# Patient Record
Sex: MALE | Race: ASIAN | NOT HISPANIC OR LATINO | Employment: OTHER
[De-identification: names, ages, dates, MRNs, and addresses within clinical notes are randomized per-mention and may not be internally consistent; named-entity substitution may affect disease eponyms.]

---

## 2017-05-16 ENCOUNTER — MYAURORA ACCOUNT LINK (OUTPATIENT)
Dept: OTHER | Age: 77
End: 2017-05-16

## 2017-05-16 ENCOUNTER — PRIOR ORIGINAL RECORDS (OUTPATIENT)
Dept: OTHER | Age: 77
End: 2017-05-16

## 2017-05-22 ENCOUNTER — PRIOR ORIGINAL RECORDS (OUTPATIENT)
Dept: OTHER | Age: 77
End: 2017-05-22

## 2017-05-30 ENCOUNTER — PRIOR ORIGINAL RECORDS (OUTPATIENT)
Dept: OTHER | Age: 77
End: 2017-05-30

## 2017-06-02 ENCOUNTER — OFFICE VISIT (OUTPATIENT)
Dept: INTERNAL MEDICINE CLINIC | Facility: CLINIC | Age: 77
End: 2017-06-02

## 2017-06-02 DIAGNOSIS — E78.5 HYPERLIPIDEMIA, UNSPECIFIED HYPERLIPIDEMIA TYPE: ICD-10-CM

## 2017-06-02 DIAGNOSIS — J30.9 ALLERGIC RHINITIS, UNSPECIFIED ALLERGIC RHINITIS TRIGGER, UNSPECIFIED RHINITIS SEASONALITY: ICD-10-CM

## 2017-06-02 DIAGNOSIS — Z00.00 ENCOUNTER FOR MEDICARE ANNUAL WELLNESS EXAM: Primary | ICD-10-CM

## 2017-06-02 DIAGNOSIS — J45.40 MODERATE PERSISTENT ASTHMA WITHOUT COMPLICATION: ICD-10-CM

## 2017-06-02 DIAGNOSIS — Z23 NEED FOR VACCINATION: ICD-10-CM

## 2017-06-02 DIAGNOSIS — I25.10 CORONARY ARTERY DISEASE INVOLVING NATIVE CORONARY ARTERY OF NATIVE HEART WITHOUT ANGINA PECTORIS: ICD-10-CM

## 2017-06-02 DIAGNOSIS — N40.0 BENIGN PROSTATIC HYPERPLASIA, PRESENCE OF LOWER URINARY TRACT SYMPTOMS UNSPECIFIED, UNSPECIFIED MORPHOLOGY: ICD-10-CM

## 2017-06-02 DIAGNOSIS — I10 ESSENTIAL HYPERTENSION: ICD-10-CM

## 2017-06-02 PROCEDURE — G0009 ADMIN PNEUMOCOCCAL VACCINE: HCPCS | Performed by: INTERNAL MEDICINE

## 2017-06-02 PROCEDURE — 90732 PPSV23 VACC 2 YRS+ SUBQ/IM: CPT | Performed by: INTERNAL MEDICINE

## 2017-06-02 PROCEDURE — G0439 PPPS, SUBSEQ VISIT: HCPCS | Performed by: INTERNAL MEDICINE

## 2017-06-02 NOTE — PROGRESS NOTES
HPI:   Patria Mcginnis is a 77year old male who presents for a Medicare Subsequent Annual Wellness visit (Pt already had Initial Annual Wellness).       His last annual assessment has been over 1 year: Annual Physical due on 04/22/2017         Patient Care T Inhalation Aero Soln Inhale 2 puffs into the lungs every 6 (six) hours as needed for Wheezing. aspirin 81 MG Oral Tab Take 81 mg by mouth daily. Twice daily   pravastatin (PRAVACHOL) 40 MG Oral Tab Take 40 mg by mouth daily.    beclomethasone (QVAR) 80 MC incontinence  MUSCULOSKELETAL: denies back pain  NEURO: denies headaches  PSYCHE: denies depression or anxiety  HEMATOLOGIC: denies hx of anemia  ENDOCRINE: denies thyroid history  ALL/ASTHMA: + hx of allergy or asthma    EXAM:   /84 mmHg  Pulse 59 rashes or lesions   Lymph nodes: Cervical, supraclavicular, and axillary nodes normal   Neurologic: Normal            SUGGESTED VACCINATIONS - Influenza, Pneumococcal, Zoster, Tetanus     Immunization History   Administered Date(s) Administered   • Influen Diet assessment: good     Advanced Directive:  Living Will on file in [address]? Alison Radhaly does not have a Living Will on file in [address]  The patient has this document but we do not have it in [facility], and patient is instructed to get our office a copy of i Fall/Risk Assessment     Do you have 3 or more medical conditions?: 0-No    Have you fallen in the last 12 months?: 0-No    Do you accidently lose urine?: 0-No    Do you have difficulty seeing?: 0-No    Do you have any difficulty walking or getting up? due on 04/23/2018 Update Health Maintenance if applicable    Flex Sigmoidoscopy Screen every 10 years No results found for this or any previous visit. No flowsheet data found.      Fecal Occult Blood Annually No results found for: FOB No flowsheet data foun

## 2017-06-03 VITALS
HEART RATE: 59 BPM | SYSTOLIC BLOOD PRESSURE: 134 MMHG | HEIGHT: 66 IN | BODY MASS INDEX: 29.41 KG/M2 | RESPIRATION RATE: 12 BRPM | WEIGHT: 183 LBS | DIASTOLIC BLOOD PRESSURE: 84 MMHG | TEMPERATURE: 98 F

## 2017-06-20 ENCOUNTER — LAB ENCOUNTER (OUTPATIENT)
Dept: LAB | Age: 77
End: 2017-06-20
Attending: INTERNAL MEDICINE
Payer: MEDICARE

## 2017-06-20 ENCOUNTER — PRIOR ORIGINAL RECORDS (OUTPATIENT)
Dept: OTHER | Age: 77
End: 2017-06-20

## 2017-06-20 DIAGNOSIS — N40.0 BENIGN PROSTATIC HYPERPLASIA, PRESENCE OF LOWER URINARY TRACT SYMPTOMS UNSPECIFIED, UNSPECIFIED MORPHOLOGY: ICD-10-CM

## 2017-06-20 DIAGNOSIS — I25.10 CORONARY ATHEROSCLEROSIS OF NATIVE CORONARY ARTERY: Primary | ICD-10-CM

## 2017-06-20 DIAGNOSIS — E78.5 HYPERLIPIDEMIA, UNSPECIFIED HYPERLIPIDEMIA TYPE: ICD-10-CM

## 2017-06-20 DIAGNOSIS — I25.2 OLD MYOCARDIAL INFARCTION: ICD-10-CM

## 2017-06-20 DIAGNOSIS — I10 ESSENTIAL HYPERTENSION, MALIGNANT: ICD-10-CM

## 2017-06-20 DIAGNOSIS — I10 ESSENTIAL HYPERTENSION: ICD-10-CM

## 2017-06-20 PROCEDURE — 36415 COLL VENOUS BLD VENIPUNCTURE: CPT

## 2017-06-20 PROCEDURE — 80061 LIPID PANEL: CPT

## 2017-06-20 PROCEDURE — 85025 COMPLETE CBC W/AUTO DIFF WBC: CPT

## 2017-06-20 PROCEDURE — 80053 COMPREHEN METABOLIC PANEL: CPT

## 2017-06-21 LAB
ALBUMIN: 4 G/DL
ALKALINE PHOSPHATATE(ALK PHOS): 55 IU/L
ALT (SGPT): 17 U/L
AST (SGOT): 26 U/L
BILIRUBIN TOTAL: 0.8 MG/DL
BILIRUBIN TOTAL: 0.8 MG/DL
BUN: 10 MG/DL
CALCIUM: 9.4 MG/DL
CHLORIDE: 106 MEQ/L
CHOLESTEROL, TOTAL: 138 MG/DL
CREATININE, SERUM: 0.8 MG/DL
GLOBULIN: 3.7 G/DL
GLUCOSE: 95 MG/DL
GLUCOSE: 95 MG/DL
HDL CHOLESTEROL: 33 MG/DL
LDL CHOLESTEROL: 62 MG/DL
NON-HDL CHOLESTEROL: 105 MG/DL
POTASSIUM, SERUM: 4 MEQ/L
PROTEIN, TOTAL: 7.7 G/DL
SGOT (AST): 26 IU/L
SGPT (ALT): 17 IU/L
SODIUM: 139 MEQ/L
TRIGLYCERIDES: 215 MG/DL

## 2017-06-29 ENCOUNTER — TELEPHONE (OUTPATIENT)
Dept: FAMILY MEDICINE CLINIC | Facility: CLINIC | Age: 77
End: 2017-06-29

## 2017-06-29 NOTE — TELEPHONE ENCOUNTER
Informed pt test results. Pt verbalized understanding. Copy of results mailed per pt request.      HI Mr Anatoliy Maldonado,     Here are your lab results. Your psa was normal.   Your cholesterol levels were in good range; triglyceride went up slightly.  Follow low fa

## 2017-07-11 ENCOUNTER — PRIOR ORIGINAL RECORDS (OUTPATIENT)
Dept: OTHER | Age: 77
End: 2017-07-11

## 2017-12-14 ENCOUNTER — PRIOR ORIGINAL RECORDS (OUTPATIENT)
Dept: OTHER | Age: 77
End: 2017-12-14

## 2018-02-06 ENCOUNTER — IMMUNIZATION (OUTPATIENT)
Dept: FAMILY MEDICINE CLINIC | Facility: CLINIC | Age: 78
End: 2018-02-06

## 2018-02-06 ENCOUNTER — OFFICE VISIT (OUTPATIENT)
Dept: FAMILY MEDICINE CLINIC | Facility: CLINIC | Age: 78
End: 2018-02-06

## 2018-02-06 DIAGNOSIS — Z23 NEED FOR INFLUENZA VACCINATION: Primary | ICD-10-CM

## 2018-02-06 PROCEDURE — 90653 IIV ADJUVANT VACCINE IM: CPT | Performed by: NURSE PRACTITIONER

## 2018-02-06 PROCEDURE — G0008 ADMIN INFLUENZA VIRUS VAC: HCPCS | Performed by: NURSE PRACTITIONER

## 2018-02-08 VITALS — TEMPERATURE: 98 F

## 2018-02-23 ENCOUNTER — OFFICE VISIT (OUTPATIENT)
Dept: INTERNAL MEDICINE CLINIC | Facility: CLINIC | Age: 78
End: 2018-02-23

## 2018-02-23 ENCOUNTER — NURSE TRIAGE (OUTPATIENT)
Dept: INTERNAL MEDICINE CLINIC | Facility: CLINIC | Age: 78
End: 2018-02-23

## 2018-02-23 VITALS
HEART RATE: 73 BPM | BODY MASS INDEX: 30 KG/M2 | WEIGHT: 182.88 LBS | SYSTOLIC BLOOD PRESSURE: 138 MMHG | DIASTOLIC BLOOD PRESSURE: 73 MMHG

## 2018-02-23 DIAGNOSIS — S43.005A SHOULDER DISLOCATION, LEFT, INITIAL ENCOUNTER: Primary | ICD-10-CM

## 2018-02-23 PROCEDURE — 99212 OFFICE O/P EST SF 10 MIN: CPT | Performed by: INTERNAL MEDICINE

## 2018-02-23 PROCEDURE — G0463 HOSPITAL OUTPT CLINIC VISIT: HCPCS | Performed by: INTERNAL MEDICINE

## 2018-02-23 NOTE — TELEPHONE ENCOUNTER
Action Requested: Summary for Provider     []  Critical Lab, Recommendations Needed  [] Need Additional Advice  []   FYI    []   Need Orders  [] Need Medications Sent to Pharmacy  []  Other     SUMMARY:    Appointment made for today 2/23/18 with Dr. Fernanda Werner

## 2018-02-23 NOTE — PROGRESS NOTES
HPI:    Patient ID: Indira Chisholm is a 69year old male. History provided by pt and wife  HPI    Left Shoulder Dislocation  Pt arrives for a follow up after recent visit to [facility] Emergency Department on 02/22/2018.  He was diagnosed with Almonte Helena prostatic hypertrophy)    • CORONARY ARTERY DISEASE    • Essential hypertension    • Glaucoma    • History of chest pain    • Hypercholesterolemia    • Hyperlipidemia    • HYPERTENSION    • Hypertension    • Myocardial infarction [facility])     2009   • OTHER D Inhalation Aerosol Inhale 2 puffs into the lungs every morning.  Disp:  Rfl:    Fexofenadine HCl (ALLEGRA OR)  Disp:  Rfl:    AmLODIPine Besylate (NORVASC) 5 MG Oral Tab  Disp:  Rfl: 0   SYMBICORT 160-4.5 MCG/ACT Inhalation Aerosol  Disp:  Rfl: 0   [facility] Nursing note and vitals reviewed. 02/23/18  1518   BP: 138/73   Pulse: 73   Weight: 182 lb 14.4 oz (83 kg)         Body mass index is 29.52 kg/m². Imaging  02/22/2018;  Xray Shoulder Left AP Internal External; Findings = Interval reduction of pre 2/23/2018, 3:11 PM.    .EVA MINA MD,  personally performed the services described in this documentation. All medical record entries made by the scribe were at my direction and in my presence.   I have reviewed the chart and discharge instructions

## 2018-02-23 NOTE — TELEPHONE ENCOUNTER
Pt's spouse, Gabriella Monsalve is calling due to Pt having a dizzy, sweating and vomiting spell for about 1 minute early this morning after coming from ER at [facility] about a displaced shoulder and would like to schedule an appointment with Dr. Jelani Mcnair

## 2018-03-09 ENCOUNTER — HOSPITAL (OUTPATIENT)
Dept: OTHER | Age: 78
End: 2018-03-09
Attending: PEDIATRICS

## 2018-03-16 ENCOUNTER — HOSPITAL (OUTPATIENT)
Dept: OTHER | Age: 78
End: 2018-03-16
Attending: PEDIATRICS

## 2018-04-05 ENCOUNTER — PRIOR ORIGINAL RECORDS (OUTPATIENT)
Dept: OTHER | Age: 78
End: 2018-04-05

## 2018-04-17 ENCOUNTER — PRIOR ORIGINAL RECORDS (OUTPATIENT)
Dept: OTHER | Age: 78
End: 2018-04-17

## 2018-07-16 ENCOUNTER — HOSPITAL (OUTPATIENT)
Dept: OTHER | Age: 78
End: 2018-07-16
Attending: INTERNAL MEDICINE

## 2018-07-16 ENCOUNTER — PRIOR ORIGINAL RECORDS (OUTPATIENT)
Dept: OTHER | Age: 78
End: 2018-07-16

## 2018-07-16 LAB
ALT SERPL-CCNC: 20 UNIT/L
ANION GAP SERPL CALC-SCNC: 10 MMOL/L (ref 10–20)
AST SERPL-CCNC: 17 UNIT/L
BUN SERPL-MCNC: 15 MG/DL (ref 6–20)
BUN/CREAT SERPL: 17 (ref 7–25)
CALCIUM SERPL-MCNC: 8.8 MG/DL (ref 8.4–10.2)
CHLORIDE: 105 MMOL/L (ref 98–107)
CHOLEST SERPL-MCNC: 131 MG/DL
CHOLEST/HDLC SERPL: 3.5 {RATIO}
CO2 SERPL-SCNC: 28 MMOL/L (ref 21–32)
CREAT SERPL-MCNC: 0.88 MG/DL (ref 0.67–1.17)
GLUCOSE SERPL-MCNC: 94 MG/DL (ref 65–99)
HDLC SERPL-MCNC: 37 MG/DL
LDLC SERPL CALC-MCNC: 63 MG/DL
LENGTH OF FAST TIME PATIENT: ABNORMAL HR
LENGTH OF FAST TIME PATIENT: NORMAL HR
NONHDLC SERPL-MCNC: 94 MG/DL
POTASSIUM SERPL-SCNC: 4 MMOL/L (ref 3.4–5.1)
SODIUM SERPL-SCNC: 139 MMOL/L (ref 135–145)
TRIGLYCERIDE (TRIGP): 157 MG/DL

## 2018-07-17 ENCOUNTER — MYAURORA ACCOUNT LINK (OUTPATIENT)
Dept: OTHER | Age: 78
End: 2018-07-17

## 2018-07-17 ENCOUNTER — PRIOR ORIGINAL RECORDS (OUTPATIENT)
Dept: OTHER | Age: 78
End: 2018-07-17

## 2018-07-18 LAB
ALT (SGPT): 20 U/L
AST (SGOT): 17 U/L
BUN: 15 MG/DL
CALCIUM: 8.8 MG/DL
CHLORIDE: 105 MEQ/L
CHOLESTEROL, TOTAL: 131 MG/DL
CREATININE, SERUM: 0.88 MG/DL
GLUCOSE: 94 MG/DL
GLUCOSE: 94 MG/DL
HDL CHOLESTEROL: 37 MG/DL
LDL CHOLESTEROL: 63 MG/DL
NON-HDL CHOLESTEROL: 94 MG/DL
POTASSIUM, SERUM: 4 MEQ/L
SGOT (AST): 17 IU/L
SGPT (ALT): 20 IU/L
SODIUM: 139 MEQ/L
TRIGLYCERIDES: 157 MG/DL

## 2018-09-26 ENCOUNTER — PRIOR ORIGINAL RECORDS (OUTPATIENT)
Dept: OTHER | Age: 78
End: 2018-09-26

## 2018-09-28 ENCOUNTER — PRIOR ORIGINAL RECORDS (OUTPATIENT)
Dept: OTHER | Age: 78
End: 2018-09-28

## 2018-12-10 ENCOUNTER — NURSE TRIAGE (OUTPATIENT)
Dept: OTHER | Age: 78
End: 2018-12-10

## 2018-12-10 NOTE — TELEPHONE ENCOUNTER
Action Requested: Summary for Provider     []  Critical Lab, Recommendations Needed  [] Need Additional Advice  []   FYI    []   Need Orders  [] Need Medications Sent to Pharmacy  []  Other     SUMMARY: Advised patient to go ER, after consulted with Dr Charlene Mullen

## 2018-12-11 ENCOUNTER — HOSPITAL (OUTPATIENT)
Dept: OTHER | Age: 78
End: 2018-12-11
Attending: EMERGENCY MEDICINE

## 2018-12-13 ENCOUNTER — TELEPHONE (OUTPATIENT)
Dept: INTERNAL MEDICINE CLINIC | Facility: CLINIC | Age: 78
End: 2018-12-13

## 2018-12-13 NOTE — TELEPHONE ENCOUNTER
Pt called in requesting to speak with Dr. Ortiz wilkerson in regards to his most recent CT scan that was completed at [facility].

## 2018-12-14 NOTE — TELEPHONE ENCOUNTER
Ct shows no fracture of cervical spine. There are arthritis noted  whih is not unsual for his age. Incidental finding of carotid artery calcification which is hardening of arteries.  He will be having physical with me next week so we can discuss it also the

## 2018-12-15 ENCOUNTER — TELEPHONE (OUTPATIENT)
Dept: INTERNAL MEDICINE CLINIC | Facility: CLINIC | Age: 78
End: 2018-12-15

## 2018-12-15 NOTE — TELEPHONE ENCOUNTER
Patient states faxed results to the office this morning and requesting call ASAP to confirm they were received.      Please advise

## 2018-12-17 NOTE — TELEPHONE ENCOUNTER
Pt called in requesting to speak with Janet in the office. When asked the reason for the call, Pt states to confirm that his \"head scan\" was received via fax. However, looking at the 12/13 encounter, it appears that this as been addressed?      Was

## 2018-12-19 NOTE — TELEPHONE ENCOUNTER
Per patient he faxed another report for CT of the head. Patient has an appt on Friday and informed wife he can bring results with them. Wife and spouse understood.

## 2018-12-20 ENCOUNTER — PRIOR ORIGINAL RECORDS (OUTPATIENT)
Dept: OTHER | Age: 78
End: 2018-12-20

## 2018-12-21 ENCOUNTER — OFFICE VISIT (OUTPATIENT)
Dept: INTERNAL MEDICINE CLINIC | Facility: CLINIC | Age: 78
End: 2018-12-21
Payer: MEDICARE

## 2018-12-21 DIAGNOSIS — Z91.81 AT RISK FOR FALLS: ICD-10-CM

## 2018-12-21 DIAGNOSIS — H91.93 BILATERAL HEARING LOSS, UNSPECIFIED HEARING LOSS TYPE: ICD-10-CM

## 2018-12-21 DIAGNOSIS — I65.29 CAROTID ATHEROSCLEROSIS, UNSPECIFIED LATERALITY: ICD-10-CM

## 2018-12-21 DIAGNOSIS — I10 ESSENTIAL HYPERTENSION: ICD-10-CM

## 2018-12-21 DIAGNOSIS — I25.10 CORONARY ARTERY DISEASE INVOLVING NATIVE CORONARY ARTERY OF NATIVE HEART WITHOUT ANGINA PECTORIS: ICD-10-CM

## 2018-12-21 DIAGNOSIS — J45.40 MODERATE PERSISTENT ASTHMA WITHOUT COMPLICATION: ICD-10-CM

## 2018-12-21 DIAGNOSIS — E78.00 PURE HYPERCHOLESTEROLEMIA: ICD-10-CM

## 2018-12-21 DIAGNOSIS — N40.0 BENIGN PROSTATIC HYPERPLASIA, UNSPECIFIED WHETHER LOWER URINARY TRACT SYMPTOMS PRESENT: ICD-10-CM

## 2018-12-21 DIAGNOSIS — J30.9 ALLERGIC RHINITIS, UNSPECIFIED SEASONALITY, UNSPECIFIED TRIGGER: ICD-10-CM

## 2018-12-21 DIAGNOSIS — Z00.00 ENCOUNTER FOR MEDICARE ANNUAL WELLNESS EXAM: Primary | ICD-10-CM

## 2018-12-21 PROCEDURE — G0439 PPPS, SUBSEQ VISIT: HCPCS | Performed by: INTERNAL MEDICINE

## 2018-12-21 PROCEDURE — G0008 ADMIN INFLUENZA VIRUS VAC: HCPCS | Performed by: INTERNAL MEDICINE

## 2018-12-21 PROCEDURE — 90653 IIV ADJUVANT VACCINE IM: CPT | Performed by: INTERNAL MEDICINE

## 2018-12-21 RX ORDER — MV-MN/OM3/DHA/EPA/FISH/LUT/ZEA 250-5-1 MG
1 CAPSULE ORAL DAILY
COMMUNITY

## 2018-12-23 VITALS
RESPIRATION RATE: 12 BRPM | HEART RATE: 61 BPM | HEIGHT: 65.5 IN | BODY MASS INDEX: 29.46 KG/M2 | DIASTOLIC BLOOD PRESSURE: 80 MMHG | SYSTOLIC BLOOD PRESSURE: 130 MMHG | TEMPERATURE: 98 F | WEIGHT: 179 LBS

## 2018-12-23 PROBLEM — H91.93 BILATERAL HEARING LOSS: Status: ACTIVE | Noted: 2018-12-23

## 2018-12-23 PROBLEM — I65.29 CAROTID ATHEROSCLEROSIS: Status: ACTIVE | Noted: 2018-12-23

## 2018-12-23 PROBLEM — Z91.81 AT RISK FOR FALLS: Status: ACTIVE | Noted: 2018-12-23

## 2018-12-23 NOTE — PROGRESS NOTES
HPI:   Olman Ny is a 69year old male who presents for a Medicare Subsequent Annual Wellness visit (Pt already had Initial Annual Wellness).       His last annual assessment has been over 1 year: Annual Physical due on 06/02/2018         Fall/Risk Asse Patient Care Team: Patient Care Team:  Familia Stephenson MD as PCP - General (Internal Medicine)    Patient Active Problem List:     Allergic rhinitis     Encounter for Medicare annual wellness exam     Hyperlipidemia     Benign prostatic hyperplasia Inhalation Aero Soln Inhale 2 puffs into the lungs every 6 (six) hours as needed for Wheezing. aspirin 81 MG Oral Tab Take 81 mg by mouth daily. Twice daily   pravastatin (PRAVACHOL) 40 MG Oral Tab Take 40 mg by mouth daily.    beclomethasone (QVAR) 80 MC Right arm, Patient Position: Sitting, Cuff Size: adult)   Pulse 61   Temp 98 °F (36.7 °C) (Tympanic)   Resp 12   Ht 5' 5.5\" (1.664 m)   Wt 179 lb (81.2 kg)   BMI 29.33 kg/m²   Estimated body mass index is 29.33 kg/m² as calculated from the following:    H and external ear canals, both ears   Nose: Nares normal, septum midline, mucosa normal, no drainage or sinus tenderness   Throat: Lips, mucosa, and tongue normal; teeth and gums normal   Neck: Supple, symmetrical, trachea midline, no adenopathy, thyroid: n without angina pectoris  Plan: stable and continue to ff with his cardio. On asa and statin tx.     (J45.40) Moderate persistent asthma without complication  Plan: pt had been ff by pulmonologist and on PRABHJOT inhaler and steroid/LABA inhaler.  Pt given flu s Value   06/13/2013 5.9       No flowsheet data found.     Fasting Blood Sugar (FSB)Annually Glucose (mg/dL)   Date Value   06/20/2017 95     GLUCOSE (P) (mg/dL)   Date Value   04/18/2016 96       Cardiovascular Disease Screening     LDL Annually LDL Cholest needed    Zoster   Not covered by Medicare Part B No vaccine history found This may be covered with your pharmacy  prescription benefits      [facility] Internal Lab or Procedure External Lab or Procedure      Annual Monitoring of Persiste

## 2019-02-09 ENCOUNTER — DIAGNOSTIC TRANS (OUTPATIENT)
Dept: OTHER | Age: 79
End: 2019-02-09

## 2019-02-09 LAB
ANALYZER ANC (IANC): ABNORMAL
ANION GAP SERPL CALC-SCNC: 12 MMOL/L (ref 10–20)
BASOPHILS # BLD: 0.1 THOUSAND/MCL (ref 0–0.3)
BASOPHILS NFR BLD: 1 %
BUN SERPL-MCNC: 16 MG/DL (ref 6–20)
BUN/CREAT SERPL: 14 (ref 7–25)
CALCIUM SERPL-MCNC: 8.9 MG/DL (ref 8.4–10.2)
CHLORIDE: 100 MMOL/L (ref 98–107)
CO2 SERPL-SCNC: 27 MMOL/L (ref 21–32)
CREAT SERPL-MCNC: 1.14 MG/DL (ref 0.67–1.17)
DIFFERENTIAL METHOD BLD: ABNORMAL
EOSINOPHIL # BLD: 0.3 THOUSAND/MCL (ref 0.1–0.5)
EOSINOPHIL NFR BLD: 2 %
ERYTHROCYTE [DISTWIDTH] IN BLOOD: 13 % (ref 11–15)
GLUCOSE SERPL-MCNC: 101 MG/DL (ref 65–99)
HEMATOCRIT: 35 % (ref 39–51)
HGB BLD-MCNC: 11.6 GM/DL (ref 13–17)
IMM GRANULOCYTES # BLD AUTO: 0.1 THOUSAND/MCL (ref 0–0.2)
IMM GRANULOCYTES NFR BLD: 1 %
LYMPHOCYTES # BLD: 1.5 THOUSAND/MCL (ref 1–4)
LYMPHOCYTES NFR BLD: 13 %
MAGNESIUM SERPL-MCNC: 1.9 MG/DL (ref 1.7–2.4)
MCH RBC QN AUTO: 28.9 PG (ref 26–34)
MCHC RBC AUTO-ENTMCNC: 33.1 GM/DL (ref 32–36.5)
MCV RBC AUTO: 87.1 FL (ref 78–100)
MONOCYTES # BLD: 1.1 THOUSAND/MCL (ref 0.3–0.9)
MONOCYTES NFR BLD: 9 %
NEUTROPHILS # BLD: 9.1 THOUSAND/MCL (ref 1.8–7.7)
NEUTROPHILS NFR BLD: 74 %
NEUTS SEG NFR BLD: ABNORMAL %
NRBC (NRBCRE): 0 /100 WBC
PLATELET # BLD: 336 THOUSAND/MCL (ref 140–450)
POTASSIUM SERPL-SCNC: 3.6 MMOL/L (ref 3.4–5.1)
RBC # BLD: 4.02 MILLION/MCL (ref 4.5–5.9)
SODIUM SERPL-SCNC: 135 MMOL/L (ref 135–145)
TROPONIN I SERPL HS-MCNC: <0.02 NG/ML
WBC # BLD: 12.1 THOUSAND/MCL (ref 4.2–11)

## 2019-02-10 ENCOUNTER — HOSPITAL (OUTPATIENT)
Dept: OTHER | Age: 79
End: 2019-02-10
Attending: INTERNAL MEDICINE

## 2019-02-10 ENCOUNTER — HOSPITAL (OUTPATIENT)
Dept: OTHER | Age: 79
End: 2019-02-10

## 2019-02-10 LAB
2009 H1N1 SUBTYPE (RF1N1): NOT DETECTED
ADENOVIRUS (RADENO): NOT DETECTED
ANALYZER ANC (IANC): ABNORMAL
ANION GAP SERPL CALC-SCNC: 14 MMOL/L (ref 10–20)
BOCAVIRUS (RBOCA): NOT DETECTED
BUN SERPL-MCNC: 15 MG/DL (ref 6–20)
BUN/CREAT SERPL: 17 (ref 7–25)
C. PNEUMONIAE (RCHLP): NOT DETECTED
CALCIUM SERPL-MCNC: 9 MG/DL (ref 8.4–10.2)
CHLORIDE: 104 MMOL/L (ref 98–107)
CO2 SERPL-SCNC: 23 MMOL/L (ref 21–32)
CORONAVIRUS 229E (RC229E): NOT DETECTED
CORONAVIRUS HKU1 (RCHKU1): NOT DETECTED
CORONAVIRUS NL63 (RCNL63): NOT DETECTED
CORONAVIRUS OC43 (RCO43): NOT DETECTED
CREAT SERPL-MCNC: 0.9 MG/DL (ref 0.67–1.17)
ERYTHROCYTE [DISTWIDTH] IN BLOOD: 13 % (ref 11–15)
GLUCOSE SERPL-MCNC: 139 MG/DL (ref 65–99)
HEMATOCRIT: 36 % (ref 39–51)
HGB BLD-MCNC: 11.8 GM/DL (ref 13–17)
INFLUENZA A SUBTYPE H1 (RFLH1): NOT DETECTED
INFLUENZA A SUBTYPE H3 (RFLH3): NOT DETECTED
INFLUENZA A UNSUBTYPABLE (RIAU): ABNORMAL
INFLUENZA B VIRUS (XFLUB): NOT DETECTED
M. PNEUMONIAE (RMYPP): NOT DETECTED
MCH RBC QN AUTO: 28 PG (ref 26–34)
MCHC RBC AUTO-ENTMCNC: 32.8 GM/DL (ref 32–36.5)
MCV RBC AUTO: 85.5 FL (ref 78–100)
METAPNEUMOVIRUS (RMETA): NOT DETECTED
NRBC (NRBCRE): 0 /100 WBC
PARAINFLUENZA, TYPE 1 (RPAR1): NOT DETECTED
PARAINFLUENZA, TYPE 2 (RPAR2): NOT DETECTED
PARAINFLUENZA, TYPE 3 (RPAR3): NOT DETECTED
PARAINFLUENZA, TYPE 4 (RPAR4): NOT DETECTED
PLATELET # BLD: 345 THOUSAND/MCL (ref 140–450)
POTASSIUM SERPL-SCNC: 4.3 MMOL/L (ref 3.4–5.1)
RBC # BLD: 4.21 MILLION/MCL (ref 4.5–5.9)
RHINOVIRUS/ENTEROVIRUS (RRHINO): POSITIVE
RSV, SUBTYPE A (RRSVA): NOT DETECTED
RSV, SUBTYPE B (RRSVB): NOT DETECTED
SODIUM SERPL-SCNC: 137 MMOL/L (ref 135–145)
SPECIMEN SOURCE: ABNORMAL
WBC # BLD: 11.9 THOUSAND/MCL (ref 4.2–11)

## 2019-02-11 ENCOUNTER — TELEPHONE (OUTPATIENT)
Dept: INTERNAL MEDICINE CLINIC | Facility: CLINIC | Age: 79
End: 2019-02-11

## 2019-02-11 RX ORDER — DOXYCYCLINE 100 MG/1
100 CAPSULE ORAL 2 TIMES DAILY
Qty: 14 CAPSULE | Refills: 0 | Status: SHIPPED | OUTPATIENT
Start: 2019-02-11 | End: 2019-02-18

## 2019-02-11 NOTE — TELEPHONE ENCOUNTER
Called patient to inquire about medication reaction. Per patient, the pharmacy told him that z-pack falls under the same class as Clarithromycin. Per patient, he has a history of an adverse reaction to Clarithromycin.      Called pharmacist at [facility]

## 2019-02-11 NOTE — TELEPHONE ENCOUNTER
Kelly from [place] called and she stated the Pt is having a drug interaction with the Z Ebenezer    And she wants to know another medication to give    Please call

## 2019-02-11 NOTE — TELEPHONE ENCOUNTER
Informed pt antibiotic was changed to Doxycycline.  Prescription sent to [address] per pt request.

## 2019-02-14 ENCOUNTER — OFFICE VISIT (OUTPATIENT)
Dept: INTERNAL MEDICINE CLINIC | Facility: CLINIC | Age: 79
End: 2019-02-14
Payer: MEDICARE

## 2019-02-14 VITALS
TEMPERATURE: 98 F | OXYGEN SATURATION: 97 % | SYSTOLIC BLOOD PRESSURE: 130 MMHG | HEART RATE: 71 BPM | BODY MASS INDEX: 29 KG/M2 | DIASTOLIC BLOOD PRESSURE: 80 MMHG | WEIGHT: 174 LBS

## 2019-02-14 DIAGNOSIS — J20.9 ACUTE BRONCHITIS, UNSPECIFIED ORGANISM: Primary | ICD-10-CM

## 2019-02-14 DIAGNOSIS — J45.40 MODERATE PERSISTENT ASTHMA WITHOUT COMPLICATION: ICD-10-CM

## 2019-02-14 PROCEDURE — G0463 HOSPITAL OUTPT CLINIC VISIT: HCPCS | Performed by: INTERNAL MEDICINE

## 2019-02-14 PROCEDURE — 99214 OFFICE O/P EST MOD 30 MIN: CPT | Performed by: INTERNAL MEDICINE

## 2019-02-14 PROCEDURE — 1111F DSCHRG MED/CURRENT MED MERGE: CPT | Performed by: INTERNAL MEDICINE

## 2019-02-14 RX ORDER — PREDNISONE 10 MG/1
TABLET ORAL
Refills: 0 | COMMUNITY
Start: 2019-02-10 | End: 2021-03-02 | Stop reason: ALTCHOICE

## 2019-02-14 NOTE — PROGRESS NOTES
HPI:    Patient ID: Sophie Puentes is a 69year old male. Cough   This is a new problem. The current episode started in the past 7 days. The problem has been gradually improving. The problem occurs constantly. The cough is productive of sputum.  Associate Disp:  Rfl:    Famotidine 20 MG OR TABS daily Disp:  Rfl:    Docusate Sodium (COLACE) 100 MG OR CAPS daily Disp:  Rfl:    predniSONE 10 MG Oral Tab  Disp:  Rfl: 0   LOSARTAN POTASSIUM 50 MG Oral Tab TAKE ONE TABLET BY MOUTH ONCE DAILY Disp: 90 tablet Rfl: treated with abx, and steroids. He went home on prednisone and zpak (had  To be changed to doxycycline due to allergies) with good response. Pt told to finish meds as prescribed, he will continue his inhalers. Call back if no complete resolution.     (J45.

## 2019-02-28 VITALS
DIASTOLIC BLOOD PRESSURE: 82 MMHG | BODY MASS INDEX: 29.09 KG/M2 | HEART RATE: 76 BPM | WEIGHT: 181 LBS | SYSTOLIC BLOOD PRESSURE: 170 MMHG | RESPIRATION RATE: 16 BRPM | HEIGHT: 66 IN

## 2019-02-28 VITALS
HEART RATE: 60 BPM | BODY MASS INDEX: 28.93 KG/M2 | RESPIRATION RATE: 16 BRPM | SYSTOLIC BLOOD PRESSURE: 142 MMHG | WEIGHT: 180 LBS | DIASTOLIC BLOOD PRESSURE: 68 MMHG | HEIGHT: 66 IN

## 2019-03-01 VITALS
SYSTOLIC BLOOD PRESSURE: 130 MMHG | HEIGHT: 66 IN | HEART RATE: 60 BPM | BODY MASS INDEX: 29.7 KG/M2 | WEIGHT: 184.8 LBS | DIASTOLIC BLOOD PRESSURE: 72 MMHG | RESPIRATION RATE: 16 BRPM

## 2019-03-13 ENCOUNTER — APPOINTMENT (OUTPATIENT)
Dept: CARDIOLOGY | Age: 79
End: 2019-03-13
Attending: INTERNAL MEDICINE

## 2019-03-13 RX ORDER — UREA 10 %
LOTION (ML) TOPICAL
COMMUNITY

## 2019-03-13 RX ORDER — BUDESONIDE AND FORMOTEROL FUMARATE DIHYDRATE 80; 4.5 UG/1; UG/1
AEROSOL RESPIRATORY (INHALATION)
COMMUNITY
End: 2019-10-29 | Stop reason: SDUPTHER

## 2019-03-13 RX ORDER — ASPIRIN 81 MG
TABLET, DELAYED RELEASE (ENTERIC COATED) ORAL
COMMUNITY

## 2019-03-13 RX ORDER — GUAIFENESIN 600 MG/1
TABLET, EXTENDED RELEASE ORAL
COMMUNITY
End: 2023-03-02

## 2019-03-13 RX ORDER — ALBUTEROL SULFATE 90 UG/1
AEROSOL, METERED RESPIRATORY (INHALATION)
COMMUNITY
Start: 2017-05-30

## 2019-03-13 RX ORDER — ASCORBIC ACID 500 MG
TABLET ORAL
COMMUNITY

## 2019-03-14 RX ORDER — PRAVASTATIN SODIUM 40 MG
TABLET ORAL
COMMUNITY
Start: 2018-10-15 | End: 2019-04-11 | Stop reason: SDUPTHER

## 2019-03-14 RX ORDER — LOSARTAN POTASSIUM 100 MG/1
TABLET ORAL
COMMUNITY
Start: 2018-12-28 | End: 2019-07-03 | Stop reason: SDUPTHER

## 2019-03-14 RX ORDER — AMLODIPINE BESYLATE 5 MG/1
TABLET ORAL
COMMUNITY
Start: 2018-11-20 | End: 2019-08-20 | Stop reason: SDUPTHER

## 2019-03-14 RX ORDER — METOPROLOL SUCCINATE 50 MG/1
TABLET, EXTENDED RELEASE ORAL
COMMUNITY
Start: 2018-11-20 | End: 2019-08-20 | Stop reason: SDUPTHER

## 2019-03-14 RX ORDER — FAMOTIDINE 20 MG/1
TABLET, FILM COATED ORAL
COMMUNITY
Start: 2018-11-20 | End: 2019-08-20 | Stop reason: SDUPTHER

## 2019-03-15 ENCOUNTER — TELEPHONE (OUTPATIENT)
Dept: CARDIOLOGY | Age: 79
End: 2019-03-15

## 2019-03-18 ENCOUNTER — TELEPHONE (OUTPATIENT)
Dept: CARDIOLOGY | Age: 79
End: 2019-03-18

## 2019-03-22 ENCOUNTER — ANCILLARY PROCEDURE (OUTPATIENT)
Dept: CARDIOLOGY | Age: 79
End: 2019-03-22
Attending: INTERNAL MEDICINE

## 2019-03-22 ENCOUNTER — APPOINTMENT (OUTPATIENT)
Dept: CARDIOLOGY | Age: 79
End: 2019-03-22
Attending: INTERNAL MEDICINE

## 2019-03-22 DIAGNOSIS — I71.40 ABDOMINAL AORTIC ANEURYSM (AAA) WITHOUT RUPTURE (CMD): ICD-10-CM

## 2019-03-22 PROCEDURE — 93978 VASCULAR STUDY: CPT | Performed by: INTERNAL MEDICINE

## 2019-03-25 ENCOUNTER — ANCILLARY PROCEDURE (OUTPATIENT)
Dept: CARDIOLOGY | Age: 79
End: 2019-03-25
Attending: INTERNAL MEDICINE

## 2019-03-25 VITALS — HEIGHT: 66 IN | BODY MASS INDEX: 27.32 KG/M2 | WEIGHT: 170 LBS

## 2019-03-25 DIAGNOSIS — I25.10 CORONARY ARTERY DISEASE: ICD-10-CM

## 2019-03-25 LAB
HEART RATE RESERVE PREDICTED: 14.69 BPM
LV EF: 60 %
PEAK HR ACHIEVED: 122 BPM
RESTING HR ACHIEVED: 71 BPM
STRESS BASELINE BP: NORMAL MMHG
STRESS PERCENT HR: 85 %
STRESS POST ESTIMATED WORKLOAD: 6.1 METS
STRESS POST EXERCISE DUR MIN: 6 MIN
STRESS POST EXERCISE DUR SEC: 21 SEC
STRESS POST PEAK BP: NORMAL MMHG
STRESS TARGET HR: 143 BPM

## 2019-03-25 PROCEDURE — 93015 CV STRESS TEST SUPVJ I&R: CPT | Performed by: INTERNAL MEDICINE

## 2019-03-25 PROCEDURE — 78452 HT MUSCLE IMAGE SPECT MULT: CPT | Performed by: INTERNAL MEDICINE

## 2019-03-25 PROCEDURE — A9502 TC99M TETROFOSMIN: HCPCS | Performed by: INTERNAL MEDICINE

## 2019-03-25 ASSESSMENT — EXERCISE STRESS TEST
PEAK_HR: 99
PEAK_BP: 154/70
PEAK_HR: 122
GRADE: 13
STAGE_CATEGORIES: 1
PEAK_HR: 90
PEAK_BP: 180/80
STAGE_CATEGORIES: 2
PEAK_RPP: 21960
GRADE: 12
PEAK_BP: 160/82
PEAK_HR: 118
PEAK_BP: 180/80
PEAK_HR: 71
STAGE_CATEGORIES: RESTING
MPH: 1.7
MPH: 2.7
STAGE_CATEGORIES: RECOVERY 1
PEAK_RPP: 10508
STAGE_CATEGORIES: 3
PEAK_RPP: 21960
PEAK_BP: 160/80
PEAK_RPP: 13398
PEAK_BP: 130/78
PEAK_HR: 122
STAGE_CATEGORIES: RECOVERY 2
PEAK_RPP: 14400
STAGE_CATEGORIES: RECOVERY 0
PEAK_RPP: 18880
GRADE: 10
PEAK_BP: 148/68
MPH: 2.5
PEAK_RPP: 12870
PEAK_HR: 87

## 2019-03-30 PROBLEM — I25.10 CORONARY ARTERY DISEASE INVOLVING NATIVE CORONARY ARTERY OF NATIVE HEART WITHOUT ANGINA PECTORIS: Status: ACTIVE | Noted: 2017-06-02

## 2019-03-30 PROBLEM — M06.9 RHEUMATOID ARTHRITIS  (CMD): Status: ACTIVE | Noted: 2019-03-30

## 2019-03-30 PROBLEM — I10 BENIGN ESSENTIAL HYPERTENSION: Status: ACTIVE | Noted: 2017-06-02

## 2019-03-30 ASSESSMENT — ENCOUNTER SYMPTOMS
ALLERGIC/IMMUNOLOGIC COMMENTS: NO NEW FOOD ALLERGIES
COUGH: 0
SUSPICIOUS LESIONS: 0
HEMOPTYSIS: 0
BRUISES/BLEEDS EASILY: 0
WEIGHT LOSS: 0
CHILLS: 0
WEIGHT GAIN: 0
FEVER: 0
HEMATOCHEZIA: 0

## 2019-04-01 ENCOUNTER — OFFICE VISIT (OUTPATIENT)
Dept: CARDIOLOGY | Age: 79
End: 2019-04-01

## 2019-04-01 VITALS
HEIGHT: 66 IN | DIASTOLIC BLOOD PRESSURE: 68 MMHG | BODY MASS INDEX: 28.61 KG/M2 | HEART RATE: 64 BPM | WEIGHT: 178 LBS | SYSTOLIC BLOOD PRESSURE: 138 MMHG

## 2019-04-01 DIAGNOSIS — I25.10 ASHD (ARTERIOSCLEROTIC HEART DISEASE): ICD-10-CM

## 2019-04-01 DIAGNOSIS — I10 BENIGN ESSENTIAL HYPERTENSION: ICD-10-CM

## 2019-04-01 DIAGNOSIS — I65.23 BILATERAL CAROTID ARTERY STENOSIS: ICD-10-CM

## 2019-04-01 DIAGNOSIS — I25.10 CORONARY ARTERY DISEASE INVOLVING NATIVE CORONARY ARTERY OF NATIVE HEART WITHOUT ANGINA PECTORIS: Primary | ICD-10-CM

## 2019-04-01 DIAGNOSIS — E78.00 HYPERCHOLESTEREMIA: ICD-10-CM

## 2019-04-01 DIAGNOSIS — I25.2 MI, OLD: ICD-10-CM

## 2019-04-01 PROCEDURE — 99214 OFFICE O/P EST MOD 30 MIN: CPT | Performed by: INTERNAL MEDICINE

## 2019-04-01 SDOH — HEALTH STABILITY: PHYSICAL HEALTH: ON AVERAGE, HOW MANY MINUTES DO YOU ENGAGE IN EXERCISE AT THIS LEVEL?: 30 MIN

## 2019-04-01 SDOH — HEALTH STABILITY: PHYSICAL HEALTH: ON AVERAGE, HOW MANY DAYS PER WEEK DO YOU ENGAGE IN MODERATE TO STRENUOUS EXERCISE (LIKE A BRISK WALK)?: 4 DAYS

## 2019-04-01 ASSESSMENT — ENCOUNTER SYMPTOMS: WEAKNESS: 0

## 2019-04-11 RX ORDER — PRAVASTATIN SODIUM 40 MG
TABLET ORAL
Qty: 90 TABLET | Refills: 2 | Status: SHIPPED | OUTPATIENT
Start: 2019-04-11 | End: 2019-12-28 | Stop reason: SDUPTHER

## 2019-07-05 RX ORDER — LOSARTAN POTASSIUM 100 MG/1
TABLET ORAL
Qty: 90 TABLET | Refills: 2 | Status: SHIPPED | OUTPATIENT
Start: 2019-07-05 | End: 2020-03-24

## 2019-08-20 RX ORDER — AMLODIPINE BESYLATE 5 MG/1
TABLET ORAL
Qty: 90 TABLET | Refills: 2 | Status: SHIPPED | OUTPATIENT
Start: 2019-08-20 | End: 2020-05-26

## 2019-08-20 RX ORDER — METOPROLOL SUCCINATE 50 MG/1
TABLET, EXTENDED RELEASE ORAL
Qty: 180 TABLET | Refills: 2 | Status: SHIPPED | OUTPATIENT
Start: 2019-08-20 | End: 2020-05-08

## 2019-08-20 RX ORDER — FAMOTIDINE 20 MG/1
TABLET, FILM COATED ORAL
Qty: 90 TABLET | Refills: 2 | Status: SHIPPED | OUTPATIENT
Start: 2019-08-20 | End: 2020-04-08

## 2019-10-01 ENCOUNTER — APPOINTMENT (OUTPATIENT)
Dept: CARDIOLOGY | Age: 79
End: 2019-10-01
Attending: INTERNAL MEDICINE

## 2019-10-08 ENCOUNTER — APPOINTMENT (OUTPATIENT)
Dept: CARDIOLOGY | Age: 79
End: 2019-10-08

## 2019-10-27 PROBLEM — K21.9 GASTROESOPHAGEAL REFLUX DISEASE WITHOUT ESOPHAGITIS: Status: ACTIVE | Noted: 2019-10-27

## 2019-10-27 ASSESSMENT — ENCOUNTER SYMPTOMS
CHILLS: 0
COUGH: 0
WEIGHT LOSS: 0
HEMOPTYSIS: 0
WEAKNESS: 0
FEVER: 0
WEIGHT GAIN: 0
ALLERGIC/IMMUNOLOGIC COMMENTS: NO NEW FOOD ALLERGIES
HEMATOCHEZIA: 0
BRUISES/BLEEDS EASILY: 0
SUSPICIOUS LESIONS: 0

## 2019-10-29 ENCOUNTER — OFFICE VISIT (OUTPATIENT)
Dept: CARDIOLOGY | Age: 79
End: 2019-10-29

## 2019-10-29 VITALS
HEART RATE: 60 BPM | WEIGHT: 184 LBS | BODY MASS INDEX: 29.57 KG/M2 | HEIGHT: 66 IN | SYSTOLIC BLOOD PRESSURE: 150 MMHG | DIASTOLIC BLOOD PRESSURE: 76 MMHG | RESPIRATION RATE: 16 BRPM

## 2019-10-29 DIAGNOSIS — I77.811 ECTATIC ABDOMINAL AORTA (CMD): ICD-10-CM

## 2019-10-29 DIAGNOSIS — I25.10 CORONARY ARTERY DISEASE INVOLVING NATIVE CORONARY ARTERY OF NATIVE HEART WITHOUT ANGINA PECTORIS: ICD-10-CM

## 2019-10-29 DIAGNOSIS — E78.00 HYPERCHOLESTEREMIA: ICD-10-CM

## 2019-10-29 DIAGNOSIS — R73.9 HYPERGLYCEMIA: ICD-10-CM

## 2019-10-29 DIAGNOSIS — I10 BENIGN ESSENTIAL HYPERTENSION: Primary | ICD-10-CM

## 2019-10-29 PROCEDURE — 99214 OFFICE O/P EST MOD 30 MIN: CPT | Performed by: INTERNAL MEDICINE

## 2019-10-29 RX ORDER — FEXOFENADINE HCL 180 MG/1
180 TABLET ORAL DAILY
COMMUNITY
End: 2020-04-08

## 2019-10-29 RX ORDER — BUDESONIDE AND FORMOTEROL FUMARATE DIHYDRATE 160; 4.5 UG/1; UG/1
2 AEROSOL RESPIRATORY (INHALATION) 2 TIMES DAILY
Refills: 2 | COMMUNITY
Start: 2019-09-04

## 2019-10-29 SDOH — HEALTH STABILITY: PHYSICAL HEALTH: ON AVERAGE, HOW MANY DAYS PER WEEK DO YOU ENGAGE IN MODERATE TO STRENUOUS EXERCISE (LIKE A BRISK WALK)?: 4 DAYS

## 2019-10-29 SDOH — HEALTH STABILITY: PHYSICAL HEALTH: ON AVERAGE, HOW MANY MINUTES DO YOU ENGAGE IN EXERCISE AT THIS LEVEL?: 30 MIN

## 2019-10-29 ASSESSMENT — PATIENT HEALTH QUESTIONNAIRE - PHQ9
SUM OF ALL RESPONSES TO PHQ9 QUESTIONS 1 AND 2: 0
1. LITTLE INTEREST OR PLEASURE IN DOING THINGS: NOT AT ALL
2. FEELING DOWN, DEPRESSED OR HOPELESS: NOT AT ALL
SUM OF ALL RESPONSES TO PHQ9 QUESTIONS 1 AND 2: 0

## 2019-11-13 ENCOUNTER — TELEPHONE (OUTPATIENT)
Dept: CARDIOLOGY | Age: 79
End: 2019-11-13

## 2019-12-30 RX ORDER — PRAVASTATIN SODIUM 40 MG
40 TABLET ORAL NIGHTLY
Qty: 90 TABLET | Refills: 3 | Status: SHIPPED | OUTPATIENT
Start: 2019-12-30 | End: 2020-12-24

## 2020-01-22 ENCOUNTER — OFFICE VISIT (OUTPATIENT)
Dept: INTERNAL MEDICINE CLINIC | Facility: CLINIC | Age: 80
End: 2020-01-22
Payer: MEDICARE

## 2020-01-22 VITALS
WEIGHT: 182 LBS | BODY MASS INDEX: 29.96 KG/M2 | HEART RATE: 65 BPM | DIASTOLIC BLOOD PRESSURE: 76 MMHG | SYSTOLIC BLOOD PRESSURE: 134 MMHG | OXYGEN SATURATION: 97 % | TEMPERATURE: 98 F | HEIGHT: 65.5 IN

## 2020-01-22 DIAGNOSIS — J45.40 MODERATE PERSISTENT ASTHMA WITHOUT COMPLICATION: ICD-10-CM

## 2020-01-22 DIAGNOSIS — I25.10 CORONARY ARTERY DISEASE INVOLVING NATIVE CORONARY ARTERY OF NATIVE HEART WITHOUT ANGINA PECTORIS: ICD-10-CM

## 2020-01-22 DIAGNOSIS — Z91.81 AT RISK FOR FALLS: ICD-10-CM

## 2020-01-22 DIAGNOSIS — J30.9 ALLERGIC RHINITIS, UNSPECIFIED SEASONALITY, UNSPECIFIED TRIGGER: ICD-10-CM

## 2020-01-22 DIAGNOSIS — R35.0 BENIGN PROSTATIC HYPERPLASIA WITH URINARY FREQUENCY: ICD-10-CM

## 2020-01-22 DIAGNOSIS — I65.29 CAROTID ATHEROSCLEROSIS, UNSPECIFIED LATERALITY: ICD-10-CM

## 2020-01-22 DIAGNOSIS — E78.00 PURE HYPERCHOLESTEROLEMIA: ICD-10-CM

## 2020-01-22 DIAGNOSIS — Z00.00 MEDICARE ANNUAL WELLNESS VISIT, SUBSEQUENT: Primary | ICD-10-CM

## 2020-01-22 DIAGNOSIS — I10 ESSENTIAL HYPERTENSION: ICD-10-CM

## 2020-01-22 DIAGNOSIS — N40.1 BENIGN PROSTATIC HYPERPLASIA WITH URINARY FREQUENCY: ICD-10-CM

## 2020-01-22 DIAGNOSIS — H91.93 BILATERAL HEARING LOSS, UNSPECIFIED HEARING LOSS TYPE: ICD-10-CM

## 2020-01-22 PROBLEM — J20.9 ACUTE BRONCHITIS: Status: RESOLVED | Noted: 2019-02-14 | Resolved: 2020-01-22

## 2020-01-22 PROCEDURE — G0008 ADMIN INFLUENZA VIRUS VAC: HCPCS | Performed by: INTERNAL MEDICINE

## 2020-01-22 PROCEDURE — 90662 IIV NO PRSV INCREASED AG IM: CPT | Performed by: INTERNAL MEDICINE

## 2020-01-22 PROCEDURE — G0439 PPPS, SUBSEQ VISIT: HCPCS | Performed by: INTERNAL MEDICINE

## 2020-01-22 NOTE — PROGRESS NOTES
HPI:   Delmi Gutierrez is a 67year old male who presents for a Medicare Subsequent Annual Wellness visit (Pt already had Initial Annual Wellness).       His last annual assessment has been over 1 year: Annual Physical due on 12/21/2019         Fall/Risk Asse List:     Allergic rhinitis     Hyperlipidemia     Benign prostatic hyperplasia     Moderate persistent asthma without complication     Essential hypertension     Need for vaccination     Coronary artery disease involving native coronary artery of native h surgical history (2009,2010); and colonoscopy. His family history includes Cancer in his brother; Diabetes in his son; Heart Disease in an other family member; Hypertension in his father; Other in his father.    SOCIAL HISTORY:   He  reports that he quit missing the telephone ring or doorbell:  No I have trouble hearing conversations in a noisy background such as a crowded room or restaurant:  Yes   I get confused about where sounds come from:  Sometimes I misunderstand some words in a sentence and need to atraumatic, no cyanosis or edema   Pulses: 2+ and symmetric   Skin: Skin color, texture, turgor normal, no rashes or lesions   Lymph nodes: Cervical, supraclavicular, and axillary nodes normal   Neurologic: Normal            Vaccination History     Rachael Mariee hearing loss type  Plan: pt already has hearing aids.    (I65.29) Carotid atherosclerosis, unspecified laterality  Plan: this had been ff by his cardiologist. He is currently on antiplatelet therapy and statin therapy.    (Z91.81) At risk for falls  Plan:f data found. Glaucoma Screening      Ophthalmology Visit Annually: Diabetics, FHx Glaucoma, AA>50, > 65 No flowsheet data found. Prostate Cancer Screening      PSA  Annually There are no preventive care reminders to display for this patient.

## 2020-02-18 ENCOUNTER — TELEPHONE (OUTPATIENT)
Dept: CARDIOLOGY | Age: 80
End: 2020-02-18

## 2020-02-18 RX ORDER — FAMOTIDINE 20 MG/1
20 TABLET, FILM COATED ORAL DAILY
Qty: 90 TABLET | Refills: 2 | OUTPATIENT
Start: 2020-02-18

## 2020-02-19 ENCOUNTER — TELEPHONE (OUTPATIENT)
Dept: INTERNAL MEDICINE CLINIC | Facility: CLINIC | Age: 80
End: 2020-02-19

## 2020-02-19 NOTE — TELEPHONE ENCOUNTER
Patient states pharmacy does not have medication available, asking for an alternative       AmLODIPine Besylate (NORVASC) 5 MG Oral Tab

## 2020-02-20 RX ORDER — OMEPRAZOLE 20 MG/1
20 CAPSULE, DELAYED RELEASE ORAL
Qty: 90 CAPSULE | Refills: 1 | Status: SHIPPED | OUTPATIENT
Start: 2020-02-20 | End: 2020-08-18

## 2020-02-20 NOTE — TELEPHONE ENCOUNTER
Patient reports Famotidine is on backorder, confirmed with [facility] also that is it currently on backorder. Patient requesting alternative treatment please advise. Was taking 20mg tab once daily, pt does not want to buy otc.

## 2020-02-20 NOTE — TELEPHONE ENCOUNTER
Spoke with pharmacy who indicated patient picked up amlodipine. Spoke with patient. He indicates it was famotidine that is no longer available. Asking for an alternative to famotidine.

## 2020-03-24 RX ORDER — LOSARTAN POTASSIUM 100 MG/1
100 TABLET ORAL DAILY
Qty: 90 TABLET | Refills: 0 | Status: SHIPPED | OUTPATIENT
Start: 2020-03-24 | End: 2020-06-29 | Stop reason: SDUPTHER

## 2020-04-08 ENCOUNTER — OFFICE VISIT (OUTPATIENT)
Dept: CARDIOLOGY | Age: 80
End: 2020-04-08

## 2020-04-08 DIAGNOSIS — J45.909 UNCOMPLICATED ASTHMA, UNSPECIFIED ASTHMA SEVERITY, UNSPECIFIED WHETHER PERSISTENT: ICD-10-CM

## 2020-04-08 DIAGNOSIS — I65.23 BILATERAL CAROTID ARTERY STENOSIS: ICD-10-CM

## 2020-04-08 DIAGNOSIS — I25.10 ASHD (ARTERIOSCLEROTIC HEART DISEASE): Primary | ICD-10-CM

## 2020-04-08 DIAGNOSIS — R73.9 HYPERGLYCEMIA: ICD-10-CM

## 2020-04-08 DIAGNOSIS — I10 BENIGN ESSENTIAL HYPERTENSION: ICD-10-CM

## 2020-04-08 DIAGNOSIS — E78.00 HYPERCHOLESTEREMIA: ICD-10-CM

## 2020-04-08 DIAGNOSIS — Z95.5 PRESENCE OF BARE METAL STENT IN RIGHT CORONARY ARTERY: ICD-10-CM

## 2020-04-08 DIAGNOSIS — M06.9 RHEUMATOID ARTHRITIS, INVOLVING UNSPECIFIED SITE, UNSPECIFIED RHEUMATOID FACTOR PRESENCE: ICD-10-CM

## 2020-04-08 PROCEDURE — 99443 TELEPHONE E&M BY PHYSICIAN EST PT NOT ORIG PREV 7 DAYS 21-30 MIN: CPT | Performed by: INTERNAL MEDICINE

## 2020-04-08 RX ORDER — OMEPRAZOLE 20 MG/1
20 CAPSULE, DELAYED RELEASE ORAL DAILY
COMMUNITY

## 2020-04-08 SDOH — HEALTH STABILITY: PHYSICAL HEALTH: ON AVERAGE, HOW MANY MINUTES DO YOU ENGAGE IN EXERCISE AT THIS LEVEL?: 30 MIN

## 2020-04-08 SDOH — HEALTH STABILITY: PHYSICAL HEALTH: ON AVERAGE, HOW MANY DAYS PER WEEK DO YOU ENGAGE IN MODERATE TO STRENUOUS EXERCISE (LIKE A BRISK WALK)?: 4 DAYS

## 2020-04-08 ASSESSMENT — PATIENT HEALTH QUESTIONNAIRE - PHQ9
2. FEELING DOWN, DEPRESSED OR HOPELESS: NOT AT ALL
1. LITTLE INTEREST OR PLEASURE IN DOING THINGS: NOT AT ALL
SUM OF ALL RESPONSES TO PHQ9 QUESTIONS 1 AND 2: 0
SUM OF ALL RESPONSES TO PHQ9 QUESTIONS 1 AND 2: 0

## 2020-04-08 ASSESSMENT — ENCOUNTER SYMPTOMS
WEIGHT LOSS: 0
CHILLS: 0
SUSPICIOUS LESIONS: 0
BRUISES/BLEEDS EASILY: 0
FEVER: 0
WEAKNESS: 0
HEMATOCHEZIA: 0
HEMOPTYSIS: 0
COUGH: 0
ALLERGIC/IMMUNOLOGIC COMMENTS: NO NEW FOOD ALLERGIES
WEIGHT GAIN: 0

## 2020-04-28 ENCOUNTER — APPOINTMENT (OUTPATIENT)
Dept: CARDIOLOGY | Age: 80
End: 2020-04-28

## 2020-05-08 RX ORDER — METOPROLOL SUCCINATE 50 MG/1
TABLET, EXTENDED RELEASE ORAL
Qty: 180 TABLET | Refills: 3 | Status: SHIPPED | OUTPATIENT
Start: 2020-05-08 | End: 2021-05-06

## 2020-05-26 RX ORDER — AMLODIPINE BESYLATE 5 MG/1
TABLET ORAL
Qty: 90 TABLET | Refills: 2 | Status: SHIPPED | OUTPATIENT
Start: 2020-05-26 | End: 2021-02-18

## 2020-06-05 ENCOUNTER — TELEPHONE (OUTPATIENT)
Dept: CARDIOLOGY | Age: 80
End: 2020-06-05

## 2020-06-09 ENCOUNTER — APPOINTMENT (OUTPATIENT)
Dept: CARDIOLOGY | Age: 80
End: 2020-06-09
Attending: INTERNAL MEDICINE

## 2020-06-29 ENCOUNTER — TELEPHONE (OUTPATIENT)
Dept: CARDIOLOGY | Age: 80
End: 2020-06-29

## 2020-06-29 RX ORDER — LOSARTAN POTASSIUM 100 MG/1
100 TABLET ORAL DAILY
Qty: 90 TABLET | Refills: 2 | Status: SHIPPED | OUTPATIENT
Start: 2020-06-29 | End: 2021-03-24

## 2020-06-30 RX ORDER — LOSARTAN POTASSIUM 100 MG/1
TABLET ORAL
Qty: 90 TABLET | Refills: 0 | OUTPATIENT
Start: 2020-06-30

## 2020-07-15 ENCOUNTER — ANCILLARY PROCEDURE (OUTPATIENT)
Dept: CARDIOLOGY | Age: 80
End: 2020-07-15
Attending: INTERNAL MEDICINE

## 2020-07-15 DIAGNOSIS — E78.00 HYPERCHOLESTEREMIA: ICD-10-CM

## 2020-07-15 DIAGNOSIS — R73.9 HYPERGLYCEMIA: ICD-10-CM

## 2020-07-15 DIAGNOSIS — I25.10 ASHD (ARTERIOSCLEROTIC HEART DISEASE): ICD-10-CM

## 2020-07-15 DIAGNOSIS — I65.23 BILATERAL CAROTID ARTERY STENOSIS: ICD-10-CM

## 2020-07-15 PROCEDURE — 93880 EXTRACRANIAL BILAT STUDY: CPT | Performed by: INTERNAL MEDICINE

## 2020-07-16 ENCOUNTER — APPOINTMENT (OUTPATIENT)
Dept: CARDIOLOGY | Age: 80
End: 2020-07-16
Attending: INTERNAL MEDICINE

## 2020-07-16 ENCOUNTER — TELEPHONE (OUTPATIENT)
Dept: CARDIOLOGY | Age: 80
End: 2020-07-16

## 2020-08-18 RX ORDER — OMEPRAZOLE 20 MG/1
CAPSULE, DELAYED RELEASE ORAL
Qty: 90 CAPSULE | Refills: 0 | Status: SHIPPED | OUTPATIENT
Start: 2020-08-18 | End: 2020-11-11

## 2020-10-21 ASSESSMENT — ENCOUNTER SYMPTOMS
COUGH: 0
WEAKNESS: 0
WEIGHT GAIN: 0
CHILLS: 0
HEMATOCHEZIA: 0
WEIGHT LOSS: 0
SUSPICIOUS LESIONS: 0
ALLERGIC/IMMUNOLOGIC COMMENTS: NO NEW FOOD ALLERGIES
HEMOPTYSIS: 0
FEVER: 0
BRUISES/BLEEDS EASILY: 0

## 2020-10-22 ENCOUNTER — APPOINTMENT (OUTPATIENT)
Dept: CARDIOLOGY | Age: 80
End: 2020-10-22

## 2020-10-22 ENCOUNTER — TELEPHONE (OUTPATIENT)
Dept: CARDIOLOGY | Age: 80
End: 2020-10-22

## 2020-11-11 RX ORDER — OMEPRAZOLE 20 MG/1
CAPSULE, DELAYED RELEASE ORAL
Qty: 90 CAPSULE | Refills: 0 | Status: SHIPPED | OUTPATIENT
Start: 2020-11-11 | End: 2021-02-03

## 2020-12-21 ENCOUNTER — TELEPHONE (OUTPATIENT)
Dept: INTERNAL MEDICINE CLINIC | Facility: CLINIC | Age: 80
End: 2020-12-21

## 2020-12-21 NOTE — TELEPHONE ENCOUNTER
Patient wondering if Phyllis Blue was offering the Covid vaccine yet as he is advanced in age. Informed patient we do not yet have a date of when available, and to call back at a later date to see if in. Patient verbalizes understanding and agrees.

## 2020-12-24 RX ORDER — PRAVASTATIN SODIUM 40 MG
40 TABLET ORAL NIGHTLY
Qty: 90 TABLET | Refills: 1 | Status: SHIPPED | OUTPATIENT
Start: 2020-12-24 | End: 2021-07-02

## 2021-02-03 DIAGNOSIS — Z23 NEED FOR VACCINATION: ICD-10-CM

## 2021-02-03 RX ORDER — OMEPRAZOLE 20 MG/1
CAPSULE, DELAYED RELEASE ORAL
Qty: 90 CAPSULE | Refills: 0 | Status: SHIPPED | OUTPATIENT
Start: 2021-02-03 | End: 2021-05-18

## 2021-02-04 NOTE — TELEPHONE ENCOUNTER
Patient informed of message below. Patient states he is scared to go out due to the pandemic.  I offered a virtual appointment but patient wants to calls his insurance and verify if they cover virtual appointments and he will call back to schedule virtual a

## 2021-02-05 NOTE — TELEPHONE ENCOUNTER
Scheduled patient for telephone visit with Dr. Kimberlee Herrera on 2/9 at 2:00 pm.    Patient states his cell phone is not a smart phone. Land line appt approved by Dr. Kimberlee Herrera via [employer].

## 2021-02-09 ENCOUNTER — VIRTUAL PHONE E/M (OUTPATIENT)
Dept: INTERNAL MEDICINE CLINIC | Facility: CLINIC | Age: 81
End: 2021-02-09
Payer: MEDICARE

## 2021-02-09 DIAGNOSIS — Z23 HIGH PRIORITY FOR COVID-19 VIRUS VACCINATION: Primary | ICD-10-CM

## 2021-02-09 PROCEDURE — 99212 OFFICE O/P EST SF 10 MIN: CPT | Performed by: INTERNAL MEDICINE

## 2021-02-09 NOTE — PROGRESS NOTES
HPI:    Patient ID: Chari Saucedo is a 79year old male. Patient had televisit today for his concern and discussion regarding covid vaccination.   He hasnt been able to come in to the [facility] for face to face visit due to his concern about potential risk o (QVAR) 80 MCG/ACT Inhalation Aero Soln Inhale 1 puff into the lungs daily as needed.        • Metoprolol Tartrate 50 MG OR TABS 2x daily     • Famotidine 20 MG OR TABS daily     • Docusate Sodium (COLACE) 100 MG OR CAPS daily       Allergies:  Augmentin [Am

## 2021-02-18 RX ORDER — AMLODIPINE BESYLATE 5 MG/1
TABLET ORAL
Qty: 90 TABLET | Refills: 3 | Status: SHIPPED | OUTPATIENT
Start: 2021-02-18 | End: 2022-02-01

## 2021-03-02 ENCOUNTER — OFFICE VISIT (OUTPATIENT)
Dept: INTERNAL MEDICINE CLINIC | Facility: CLINIC | Age: 81
End: 2021-03-02
Payer: MEDICARE

## 2021-03-02 VITALS
WEIGHT: 173 LBS | DIASTOLIC BLOOD PRESSURE: 80 MMHG | SYSTOLIC BLOOD PRESSURE: 126 MMHG | BODY MASS INDEX: 28.48 KG/M2 | HEART RATE: 66 BPM | TEMPERATURE: 99 F | HEIGHT: 65.5 IN | RESPIRATION RATE: 12 BRPM

## 2021-03-02 DIAGNOSIS — H91.93 BILATERAL HEARING LOSS, UNSPECIFIED HEARING LOSS TYPE: ICD-10-CM

## 2021-03-02 DIAGNOSIS — I65.29 CAROTID ATHEROSCLEROSIS, UNSPECIFIED LATERALITY: ICD-10-CM

## 2021-03-02 DIAGNOSIS — E78.00 PURE HYPERCHOLESTEROLEMIA: ICD-10-CM

## 2021-03-02 DIAGNOSIS — Z00.00 MEDICARE ANNUAL WELLNESS VISIT, SUBSEQUENT: Primary | ICD-10-CM

## 2021-03-02 DIAGNOSIS — R35.0 BENIGN PROSTATIC HYPERPLASIA WITH URINARY FREQUENCY: ICD-10-CM

## 2021-03-02 DIAGNOSIS — Z12.5 PROSTATE CANCER SCREENING: ICD-10-CM

## 2021-03-02 DIAGNOSIS — I10 ESSENTIAL HYPERTENSION: ICD-10-CM

## 2021-03-02 DIAGNOSIS — N40.1 BENIGN PROSTATIC HYPERPLASIA WITH URINARY FREQUENCY: ICD-10-CM

## 2021-03-02 DIAGNOSIS — I25.10 CORONARY ARTERY DISEASE INVOLVING NATIVE CORONARY ARTERY OF NATIVE HEART WITHOUT ANGINA PECTORIS: ICD-10-CM

## 2021-03-02 DIAGNOSIS — Z91.81 AT RISK FOR FALLS: ICD-10-CM

## 2021-03-02 DIAGNOSIS — J30.9 ALLERGIC RHINITIS, UNSPECIFIED SEASONALITY, UNSPECIFIED TRIGGER: ICD-10-CM

## 2021-03-02 DIAGNOSIS — J45.40 MODERATE PERSISTENT ASTHMA WITHOUT COMPLICATION: ICD-10-CM

## 2021-03-02 PROCEDURE — G0439 PPPS, SUBSEQ VISIT: HCPCS | Performed by: INTERNAL MEDICINE

## 2021-03-02 NOTE — PROGRESS NOTES
HPI:   Grazyna Paul is a 79year old male who presents for a Medicare Subsequent Annual Wellness visit (Pt already had Initial Annual Wellness).       His last annual assessment has been over 1 year: Annual Physical due on 01/22/2021         Fall/Risk Asse hyperplasia     Moderate persistent asthma without complication     Essential hypertension     Need for vaccination     Coronary artery disease involving native coronary artery of native heart without angina pectoris     Carotid atherosclerosis     At risk Tab, Take 81 mg by mouth daily. Twice daily    •  pravastatin (PRAVACHOL) 40 MG Oral Tab, Take 40 mg by mouth daily.     •  Metoprolol Tartrate 50 MG OR TABS, 2x daily    •  Famotidine 20 MG OR TABS, daily       MEDICAL INFORMATION:   He  has a past medical asthma    EXAM:   /80   Pulse 66   Temp 98.8 °F (37.1 °C) (Tympanic)   Resp 12   Ht 5' 5.5\" (1.664 m)   Wt 173 lb (78.5 kg)   BMI 28.35 kg/m²   Estimated body mass index is 28.35 kg/m² as calculated from the following:    Height as of this encounter PRSV Free (71889) 01/22/2020   • FLUAD High Dose 65 yr and older (15766) 02/06/2018, 12/21/2018   • FLUZONE 6 months and older PFS 0.5 ml (36552) 11/08/2017   • Fluzone Vaccine Medicare () 10/11/2016   • Influenza 01/16/2014   • Pneumococcal (Prevnar understanding of these issues and agrees to the plan. Reinforced healthy diet, lifestyle, and exercise. No follow-ups on file.      Ned Hale MD, 3/2/2021     General Health     In the past six months, have you lost more than 10 pounds withou Annually 1/22/2020   Please get every year    Pneumococcal 13 (Prevnar)  Covered Once after 65 04/22/2016 Please get once after your 65th birthday    Pneumococcal 23 (Pneumovax)  Covered Once after 65 06/02/2017 Please get once after your 65th birthday

## 2021-03-11 ENCOUNTER — TELEPHONE (OUTPATIENT)
Dept: INTERNAL MEDICINE CLINIC | Facility: CLINIC | Age: 81
End: 2021-03-11

## 2021-03-11 NOTE — TELEPHONE ENCOUNTER
Patient is schedule for lab work tomorrow and he asking if he should take all his medication on his list.

## 2021-03-11 NOTE — TELEPHONE ENCOUNTER
Advised patient medications he is on will not interfere with the labs he will do. Patient verbalizes understanding and agrees.

## 2021-03-12 ENCOUNTER — LAB ENCOUNTER (OUTPATIENT)
Dept: LAB | Age: 81
End: 2021-03-12
Attending: INTERNAL MEDICINE
Payer: MEDICARE

## 2021-03-12 DIAGNOSIS — Z00.00 MEDICARE ANNUAL WELLNESS VISIT, SUBSEQUENT: ICD-10-CM

## 2021-03-12 DIAGNOSIS — E78.00 PURE HYPERCHOLESTEROLEMIA: ICD-10-CM

## 2021-03-12 DIAGNOSIS — Z12.5 PROSTATE CANCER SCREENING: ICD-10-CM

## 2021-03-12 LAB
ALBUMIN SERPL-MCNC: 4.1 G/DL (ref 3.4–5)
ALBUMIN/GLOB SERPL: 1 {RATIO} (ref 1–2)
ALP LIVER SERPL-CCNC: 56 U/L
ALT SERPL-CCNC: 19 U/L
ANION GAP SERPL CALC-SCNC: 4 MMOL/L (ref 0–18)
AST SERPL-CCNC: 16 U/L (ref 15–37)
BASOPHILS # BLD AUTO: 0.03 X10(3) UL (ref 0–0.2)
BASOPHILS NFR BLD AUTO: 0.4 %
BILIRUB SERPL-MCNC: 0.4 MG/DL (ref 0.1–2)
BUN BLD-MCNC: 12 MG/DL (ref 7–18)
BUN/CREAT SERPL: 12 (ref 10–20)
CALCIUM BLD-MCNC: 9.3 MG/DL (ref 8.5–10.1)
CHLORIDE SERPL-SCNC: 106 MMOL/L (ref 98–112)
CHOLEST SMN-MCNC: 127 MG/DL (ref ?–200)
CO2 SERPL-SCNC: 29 MMOL/L (ref 21–32)
COMPLEXED PSA SERPL-MCNC: 2.42 NG/ML (ref ?–4)
CREAT BLD-MCNC: 1 MG/DL
DEPRECATED RDW RBC AUTO: 41.4 FL (ref 35.1–46.3)
EOSINOPHIL # BLD AUTO: 0.85 X10(3) UL (ref 0–0.7)
EOSINOPHIL NFR BLD AUTO: 12 %
ERYTHROCYTE [DISTWIDTH] IN BLOOD BY AUTOMATED COUNT: 12.8 % (ref 11–15)
GLOBULIN PLAS-MCNC: 4.1 G/DL (ref 2.8–4.4)
GLUCOSE BLD-MCNC: 93 MG/DL (ref 70–99)
HCT VFR BLD AUTO: 40 %
HDLC SERPL-MCNC: 39 MG/DL (ref 40–59)
HGB BLD-MCNC: 13 G/DL
IMM GRANULOCYTES # BLD AUTO: 0.02 X10(3) UL (ref 0–1)
IMM GRANULOCYTES NFR BLD: 0.3 %
LDLC SERPL CALC-MCNC: 65 MG/DL (ref ?–100)
LYMPHOCYTES # BLD AUTO: 1.88 X10(3) UL (ref 1–4)
LYMPHOCYTES NFR BLD AUTO: 26.4 %
M PROTEIN MFR SERPL ELPH: 8.2 G/DL (ref 6.4–8.2)
MCH RBC QN AUTO: 28.8 PG (ref 26–34)
MCHC RBC AUTO-ENTMCNC: 32.5 G/DL (ref 31–37)
MCV RBC AUTO: 88.5 FL
MONOCYTES # BLD AUTO: 0.5 X10(3) UL (ref 0.1–1)
MONOCYTES NFR BLD AUTO: 7 %
NEUTROPHILS # BLD AUTO: 3.83 X10 (3) UL (ref 1.5–7.7)
NEUTROPHILS # BLD AUTO: 3.83 X10(3) UL (ref 1.5–7.7)
NEUTROPHILS NFR BLD AUTO: 53.9 %
NONHDLC SERPL-MCNC: 88 MG/DL (ref ?–130)
OSMOLALITY SERPL CALC.SUM OF ELEC: 287 MOSM/KG (ref 275–295)
PATIENT FASTING Y/N/NP: YES
PATIENT FASTING Y/N/NP: YES
PLATELET # BLD AUTO: 228 10(3)UL (ref 150–450)
POTASSIUM SERPL-SCNC: 4.2 MMOL/L (ref 3.5–5.1)
RBC # BLD AUTO: 4.52 X10(6)UL
SODIUM SERPL-SCNC: 139 MMOL/L (ref 136–145)
TRIGL SERPL-MCNC: 115 MG/DL (ref 30–149)
VLDLC SERPL CALC-MCNC: 23 MG/DL (ref 0–30)
WBC # BLD AUTO: 7.1 X10(3) UL (ref 4–11)

## 2021-03-12 PROCEDURE — 80053 COMPREHEN METABOLIC PANEL: CPT

## 2021-03-12 PROCEDURE — 85025 COMPLETE CBC W/AUTO DIFF WBC: CPT

## 2021-03-12 PROCEDURE — 80061 LIPID PANEL: CPT

## 2021-03-12 PROCEDURE — 36415 COLL VENOUS BLD VENIPUNCTURE: CPT

## 2021-03-24 RX ORDER — LOSARTAN POTASSIUM 100 MG/1
TABLET ORAL
Qty: 30 TABLET | Refills: 0 | Status: SHIPPED | OUTPATIENT
Start: 2021-03-24 | End: 2021-04-20

## 2021-03-25 ENCOUNTER — TELEPHONE (OUTPATIENT)
Dept: CARDIOLOGY | Age: 81
End: 2021-03-25

## 2021-03-29 RX ORDER — LOSARTAN POTASSIUM 100 MG/1
TABLET ORAL
Qty: 90 TABLET | Refills: 0 | OUTPATIENT
Start: 2021-03-29

## 2021-03-30 RX ORDER — LOSARTAN POTASSIUM 100 MG/1
TABLET ORAL
Qty: 30 TABLET | Refills: 0 | OUTPATIENT
Start: 2021-03-30

## 2021-04-20 RX ORDER — LOSARTAN POTASSIUM 100 MG/1
TABLET ORAL
Qty: 30 TABLET | Refills: 0 | Status: SHIPPED | OUTPATIENT
Start: 2021-04-20 | End: 2021-05-06 | Stop reason: SDUPTHER

## 2021-05-06 ENCOUNTER — TELEPHONE (OUTPATIENT)
Dept: CARDIOLOGY | Age: 81
End: 2021-05-06

## 2021-05-06 RX ORDER — LOSARTAN POTASSIUM 100 MG/1
TABLET ORAL
Qty: 90 TABLET | Refills: 0 | Status: SHIPPED | OUTPATIENT
Start: 2021-05-06 | End: 2021-08-23

## 2021-05-06 RX ORDER — METOPROLOL SUCCINATE 50 MG/1
50 TABLET, EXTENDED RELEASE ORAL 2 TIMES DAILY
Qty: 180 TABLET | Refills: 0 | Status: SHIPPED | OUTPATIENT
Start: 2021-05-06 | End: 2021-07-27

## 2021-05-18 RX ORDER — OMEPRAZOLE 20 MG/1
CAPSULE, DELAYED RELEASE ORAL
Qty: 90 CAPSULE | Refills: 0 | Status: SHIPPED | OUTPATIENT
Start: 2021-05-18 | End: 2021-07-26

## 2021-05-25 ASSESSMENT — ENCOUNTER SYMPTOMS
COUGH: 0
WEIGHT GAIN: 0
ALLERGIC/IMMUNOLOGIC COMMENTS: NO NEW FOOD ALLERGIES
CHILLS: 0
SUSPICIOUS LESIONS: 0
BRUISES/BLEEDS EASILY: 0
HEMATOCHEZIA: 0
WEIGHT LOSS: 0
FEVER: 0
HEMOPTYSIS: 0
WEAKNESS: 0

## 2021-05-27 ENCOUNTER — OFFICE VISIT (OUTPATIENT)
Dept: CARDIOLOGY | Age: 81
End: 2021-05-27

## 2021-05-27 VITALS
DIASTOLIC BLOOD PRESSURE: 70 MMHG | BODY MASS INDEX: 27.48 KG/M2 | HEIGHT: 66 IN | WEIGHT: 171 LBS | RESPIRATION RATE: 16 BRPM | SYSTOLIC BLOOD PRESSURE: 132 MMHG | HEART RATE: 58 BPM

## 2021-05-27 DIAGNOSIS — I25.10 CORONARY ARTERY DISEASE INVOLVING NATIVE CORONARY ARTERY OF NATIVE HEART WITHOUT ANGINA PECTORIS: ICD-10-CM

## 2021-05-27 DIAGNOSIS — I10 BENIGN ESSENTIAL HYPERTENSION: ICD-10-CM

## 2021-05-27 DIAGNOSIS — M06.9 RHEUMATOID ARTHRITIS, INVOLVING UNSPECIFIED SITE, UNSPECIFIED WHETHER RHEUMATOID FACTOR PRESENT (CMD): ICD-10-CM

## 2021-05-27 DIAGNOSIS — I77.811 ECTATIC ABDOMINAL AORTA (CMD): ICD-10-CM

## 2021-05-27 DIAGNOSIS — I65.23 BILATERAL CAROTID ARTERY STENOSIS: ICD-10-CM

## 2021-05-27 DIAGNOSIS — E78.00 HYPERCHOLESTEREMIA: ICD-10-CM

## 2021-05-27 DIAGNOSIS — I25.10 ASHD (ARTERIOSCLEROTIC HEART DISEASE): Primary | ICD-10-CM

## 2021-05-27 DIAGNOSIS — R73.9 HYPERGLYCEMIA: ICD-10-CM

## 2021-05-27 PROCEDURE — 99214 OFFICE O/P EST MOD 30 MIN: CPT | Performed by: INTERNAL MEDICINE

## 2021-05-27 RX ORDER — MONTELUKAST SODIUM 10 MG/1
10 TABLET ORAL DAILY
COMMUNITY
Start: 2021-04-24

## 2021-05-27 ASSESSMENT — PATIENT HEALTH QUESTIONNAIRE - PHQ9
SUM OF ALL RESPONSES TO PHQ9 QUESTIONS 1 AND 2: 0
1. LITTLE INTEREST OR PLEASURE IN DOING THINGS: NOT AT ALL
SUM OF ALL RESPONSES TO PHQ9 QUESTIONS 1 AND 2: 0
2. FEELING DOWN, DEPRESSED OR HOPELESS: NOT AT ALL
CLINICAL INTERPRETATION OF PHQ2 SCORE: NO FURTHER SCREENING NEEDED
CLINICAL INTERPRETATION OF PHQ9 SCORE: NO FURTHER SCREENING NEEDED

## 2021-05-27 ASSESSMENT — ENCOUNTER SYMPTOMS: MEMORY LOSS: 1

## 2021-07-02 RX ORDER — PRAVASTATIN SODIUM 40 MG
40 TABLET ORAL NIGHTLY
Qty: 90 TABLET | Refills: 3 | Status: SHIPPED | OUTPATIENT
Start: 2021-07-02 | End: 2022-06-28

## 2021-07-26 RX ORDER — OMEPRAZOLE 20 MG/1
CAPSULE, DELAYED RELEASE ORAL
Qty: 90 CAPSULE | Refills: 0 | Status: SHIPPED | OUTPATIENT
Start: 2021-07-26 | End: 2021-11-05

## 2021-07-27 RX ORDER — METOPROLOL SUCCINATE 50 MG/1
TABLET, EXTENDED RELEASE ORAL
Qty: 180 TABLET | Refills: 3 | Status: SHIPPED | OUTPATIENT
Start: 2021-07-27 | End: 2022-08-04

## 2021-08-23 RX ORDER — LOSARTAN POTASSIUM 100 MG/1
TABLET ORAL
Qty: 90 TABLET | Refills: 3 | Status: SHIPPED | OUTPATIENT
Start: 2021-08-23 | End: 2022-08-12

## 2021-11-05 RX ORDER — OMEPRAZOLE 20 MG/1
20 CAPSULE, DELAYED RELEASE ORAL
Qty: 90 CAPSULE | Refills: 1 | Status: SHIPPED | OUTPATIENT
Start: 2021-11-05 | End: 2022-05-04

## 2021-11-05 NOTE — TELEPHONE ENCOUNTER
Refill passed per [facility] protocol.     Requested Prescriptions   Pending Prescriptions Disp Refills    OMEPRAZOLE 20 MG Oral Capsule Delayed Release [Pharmacy Med Name: Omeprazole 20 MG Oral Capsule Delayed Release] 90 capsule 0     Sig: TAKE 1 CAPSULE BY MOUTH IN THE MORNING BEFORE BREAKFAST        Gastrointestional Medication Protocol Passed - 11/5/2021  9:49 AM        Passed - Appointment in past 12 or next 3 months                  Recent Outpatient Visits              8 months ago Medicare annual wellness visit, subsequent    [place] [facility] [employer] [facility]    Office Visit    8 months ago High priority for COVID-19 virus vaccination    [address]qu[address] MD    [employer] [facility]    1 year ago Jen Chahal annual wellness visit, subsequent    [place] [employer] [facility]    Office Visit    2 years ago Acute bronchitis, unspecified organism    [facility]    Office Visit    2 years ago Encounter for [employer] annual wellness exam    [place] [employer] [facility]    [facility] Visit

## 2022-02-01 RX ORDER — AMLODIPINE BESYLATE 5 MG/1
TABLET ORAL
Qty: 90 TABLET | Refills: 1 | Status: SHIPPED | OUTPATIENT
Start: 2022-02-01 | End: 2022-08-12

## 2022-03-30 ENCOUNTER — OFFICE VISIT (OUTPATIENT)
Dept: INTERNAL MEDICINE CLINIC | Facility: CLINIC | Age: 82
End: 2022-03-30
Payer: MEDICARE

## 2022-03-30 VITALS
WEIGHT: 172.63 LBS | DIASTOLIC BLOOD PRESSURE: 74 MMHG | HEIGHT: 65.5 IN | TEMPERATURE: 98 F | HEART RATE: 66 BPM | BODY MASS INDEX: 28.41 KG/M2 | OXYGEN SATURATION: 98 % | SYSTOLIC BLOOD PRESSURE: 134 MMHG

## 2022-03-30 DIAGNOSIS — I65.29 CAROTID ATHEROSCLEROSIS, UNSPECIFIED LATERALITY: ICD-10-CM

## 2022-03-30 DIAGNOSIS — Z00.00 MEDICARE ANNUAL WELLNESS VISIT, SUBSEQUENT: Primary | ICD-10-CM

## 2022-03-30 DIAGNOSIS — N40.1 BENIGN PROSTATIC HYPERPLASIA WITH URINARY FREQUENCY: ICD-10-CM

## 2022-03-30 DIAGNOSIS — I10 ESSENTIAL HYPERTENSION: ICD-10-CM

## 2022-03-30 DIAGNOSIS — E78.00 PURE HYPERCHOLESTEROLEMIA: ICD-10-CM

## 2022-03-30 DIAGNOSIS — J45.40 MODERATE PERSISTENT ASTHMA WITHOUT COMPLICATION: ICD-10-CM

## 2022-03-30 DIAGNOSIS — Z12.5 PROSTATE CANCER SCREENING: ICD-10-CM

## 2022-03-30 DIAGNOSIS — H91.93 BILATERAL HEARING LOSS, UNSPECIFIED HEARING LOSS TYPE: ICD-10-CM

## 2022-03-30 DIAGNOSIS — R35.0 BENIGN PROSTATIC HYPERPLASIA WITH URINARY FREQUENCY: ICD-10-CM

## 2022-03-30 DIAGNOSIS — I25.10 CORONARY ARTERY DISEASE INVOLVING NATIVE CORONARY ARTERY OF NATIVE HEART WITHOUT ANGINA PECTORIS: ICD-10-CM

## 2022-03-30 DIAGNOSIS — K63.5 POLYP OF COLON, UNSPECIFIED PART OF COLON, UNSPECIFIED TYPE: ICD-10-CM

## 2022-03-30 DIAGNOSIS — J30.9 ALLERGIC RHINITIS, UNSPECIFIED SEASONALITY, UNSPECIFIED TRIGGER: ICD-10-CM

## 2022-03-30 DIAGNOSIS — Z91.81 AT RISK FOR FALLS: ICD-10-CM

## 2022-03-30 PROCEDURE — G0439 PPPS, SUBSEQ VISIT: HCPCS | Performed by: INTERNAL MEDICINE

## 2022-04-04 ENCOUNTER — LAB ENCOUNTER (OUTPATIENT)
Dept: LAB | Age: 82
End: 2022-04-04
Attending: INTERNAL MEDICINE
Payer: MEDICARE

## 2022-04-04 DIAGNOSIS — Z00.00 MEDICARE ANNUAL WELLNESS VISIT, SUBSEQUENT: ICD-10-CM

## 2022-04-04 DIAGNOSIS — E78.00 PURE HYPERCHOLESTEROLEMIA: ICD-10-CM

## 2022-04-04 DIAGNOSIS — Z12.5 PROSTATE CANCER SCREENING: ICD-10-CM

## 2022-04-04 LAB
ALBUMIN SERPL-MCNC: 4 G/DL (ref 3.4–5)
ALBUMIN/GLOB SERPL: 1.1 {RATIO} (ref 1–2)
ALP LIVER SERPL-CCNC: 70 U/L
ALT SERPL-CCNC: 17 U/L
ANION GAP SERPL CALC-SCNC: 5 MMOL/L (ref 0–18)
AST SERPL-CCNC: 17 U/L (ref 15–37)
BASOPHILS # BLD AUTO: 0.04 X10(3) UL (ref 0–0.2)
BASOPHILS NFR BLD AUTO: 0.6 %
BILIRUB SERPL-MCNC: 0.4 MG/DL (ref 0.1–2)
BUN BLD-MCNC: 10 MG/DL (ref 7–18)
BUN/CREAT SERPL: 9.5 (ref 10–20)
CALCIUM BLD-MCNC: 9.2 MG/DL (ref 8.5–10.1)
CHLORIDE SERPL-SCNC: 106 MMOL/L (ref 98–112)
CHOLEST SERPL-MCNC: 116 MG/DL (ref ?–200)
CO2 SERPL-SCNC: 28 MMOL/L (ref 21–32)
COMPLEXED PSA SERPL-MCNC: 2.83 NG/ML (ref ?–4)
CREAT BLD-MCNC: 1.05 MG/DL
DEPRECATED RDW RBC AUTO: 42 FL (ref 35.1–46.3)
EOSINOPHIL # BLD AUTO: 0.81 X10(3) UL (ref 0–0.7)
EOSINOPHIL NFR BLD AUTO: 11.7 %
ERYTHROCYTE [DISTWIDTH] IN BLOOD BY AUTOMATED COUNT: 12.7 % (ref 11–15)
FASTING PATIENT LIPID ANSWER: YES
FASTING STATUS PATIENT QL REPORTED: YES
GLOBULIN PLAS-MCNC: 3.8 G/DL (ref 2.8–4.4)
GLUCOSE BLD-MCNC: 91 MG/DL (ref 70–99)
HCT VFR BLD AUTO: 39.5 %
HDLC SERPL-MCNC: 36 MG/DL (ref 40–59)
HGB BLD-MCNC: 12.4 G/DL
IMM GRANULOCYTES # BLD AUTO: 0.02 X10(3) UL (ref 0–1)
IMM GRANULOCYTES NFR BLD: 0.3 %
LDLC SERPL CALC-MCNC: 64 MG/DL (ref ?–100)
LYMPHOCYTES # BLD AUTO: 1.53 X10(3) UL (ref 1–4)
LYMPHOCYTES NFR BLD AUTO: 22.1 %
MCH RBC QN AUTO: 28.4 PG (ref 26–34)
MCHC RBC AUTO-ENTMCNC: 31.4 G/DL (ref 31–37)
MCV RBC AUTO: 90.4 FL
MONOCYTES # BLD AUTO: 0.58 X10(3) UL (ref 0.1–1)
MONOCYTES NFR BLD AUTO: 8.4 %
NEUTROPHILS # BLD AUTO: 3.94 X10 (3) UL (ref 1.5–7.7)
NEUTROPHILS # BLD AUTO: 3.94 X10(3) UL (ref 1.5–7.7)
NEUTROPHILS NFR BLD AUTO: 56.9 %
NONHDLC SERPL-MCNC: 80 MG/DL (ref ?–130)
OSMOLALITY SERPL CALC.SUM OF ELEC: 287 MOSM/KG (ref 275–295)
PLATELET # BLD AUTO: 245 10(3)UL (ref 150–450)
POTASSIUM SERPL-SCNC: 4.3 MMOL/L (ref 3.5–5.1)
PROT SERPL-MCNC: 7.8 G/DL (ref 6.4–8.2)
RBC # BLD AUTO: 4.37 X10(6)UL
SODIUM SERPL-SCNC: 139 MMOL/L (ref 136–145)
TRIGL SERPL-MCNC: 76 MG/DL (ref 30–149)
VLDLC SERPL CALC-MCNC: 11 MG/DL (ref 0–30)
WBC # BLD AUTO: 6.9 X10(3) UL (ref 4–11)

## 2022-04-04 PROCEDURE — 80053 COMPREHEN METABOLIC PANEL: CPT

## 2022-04-04 PROCEDURE — 85025 COMPLETE CBC W/AUTO DIFF WBC: CPT

## 2022-04-04 PROCEDURE — 80061 LIPID PANEL: CPT

## 2022-04-04 PROCEDURE — 36415 COLL VENOUS BLD VENIPUNCTURE: CPT

## 2022-04-06 ENCOUNTER — TELEPHONE (OUTPATIENT)
Dept: INTERNAL MEDICINE CLINIC | Facility: CLINIC | Age: 82
End: 2022-04-06

## 2022-04-19 ENCOUNTER — TELEPHONE (OUTPATIENT)
Dept: INTERNAL MEDICINE CLINIC | Facility: CLINIC | Age: 82
End: 2022-04-19

## 2022-04-19 NOTE — TELEPHONE ENCOUNTER
Left detailed at tel#[phone] voice mailbox that 4/6/22 lab orders can be done this month per Dr Earlene Agrawal orders. Non fasting state. Message sent to pt via [employer] previously as well.

## 2022-04-19 NOTE — TELEPHONE ENCOUNTER
Pt called, reviewed 4/4/22 lab results along with Dr Janette Madera recommendations. Dr Janette Madera when do you want 4/6/22 lab order to be completed by?     Please reply to pool: ELENA Giles

## 2022-05-04 RX ORDER — OMEPRAZOLE 20 MG/1
20 CAPSULE, DELAYED RELEASE ORAL
Qty: 90 CAPSULE | Refills: 0 | Status: SHIPPED | OUTPATIENT
Start: 2022-05-04 | End: 2022-08-04

## 2022-05-04 NOTE — TELEPHONE ENCOUNTER
Refill passed per [facility] protocol.     Requested Prescriptions   Pending Prescriptions Disp Refills    OMEPRAZOLE 20 MG Oral Capsule Delayed Release [Pharmacy Med Name: Omeprazole 20 MG Oral Capsule Delayed Release] 90 capsule 0     Sig: TAKE 1 CAPSULE BY MOUTH BEFORE BREAKFAST        Gastrointestional Medication Protocol Passed - 5/4/2022 10:29 AM        Passed - Appointment in past 12 or next 3 months              Future Appointments         Provider Department Appt Notes    In 3 months Bartolome John MD [place], [address] Moderate persistent asthma without complication            Recent Outpatient Visits              1 month ago Jen Chahal annual wellness visit, subsequent    [address] MD    Office Visit    1 year ago Jen Labjorn annual wellness visit, subsequent    [employer] [facility], Inez Burdick MD    Office Visit    1 year ago High priority for COVID-19 virus vaccination    [address]qu[address] MD    [employer] [facility]    2 years ago Jen Chahal annual wellness visit, subsequent    [address]qu[address] MD    Office Visit    3 years ago Acute bronchitis, unspecified organism    [facility], Inez Burdick MD    Office Visit

## 2022-05-10 ENCOUNTER — APPOINTMENT (OUTPATIENT)
Dept: CARDIOLOGY | Age: 82
End: 2022-05-10
Attending: INTERNAL MEDICINE

## 2022-05-13 RX ORDER — IPRATROPIUM BROMIDE AND ALBUTEROL SULFATE 2.5; .5 MG/3ML; MG/3ML
SOLUTION RESPIRATORY (INHALATION)
COMMUNITY
Start: 2022-04-11

## 2022-05-25 ASSESSMENT — ENCOUNTER SYMPTOMS
HEMOPTYSIS: 0
FEVER: 0
MEMORY LOSS: 1
WEIGHT GAIN: 0
WEAKNESS: 0
HEMATOCHEZIA: 0
COUGH: 0
BRUISES/BLEEDS EASILY: 0
CHILLS: 0
SUSPICIOUS LESIONS: 0
ALLERGIC/IMMUNOLOGIC COMMENTS: NO NEW FOOD ALLERGIES
WEIGHT LOSS: 0

## 2022-05-26 ENCOUNTER — OFFICE VISIT (OUTPATIENT)
Dept: CARDIOLOGY | Age: 82
End: 2022-05-26

## 2022-05-26 VITALS
DIASTOLIC BLOOD PRESSURE: 80 MMHG | SYSTOLIC BLOOD PRESSURE: 158 MMHG | BODY MASS INDEX: 27.97 KG/M2 | HEIGHT: 66 IN | HEART RATE: 70 BPM | WEIGHT: 174 LBS

## 2022-05-26 DIAGNOSIS — I25.10 CORONARY ARTERY DISEASE INVOLVING NATIVE CORONARY ARTERY OF NATIVE HEART WITHOUT ANGINA PECTORIS: ICD-10-CM

## 2022-05-26 DIAGNOSIS — E78.00 HYPERCHOLESTEREMIA: Primary | ICD-10-CM

## 2022-05-26 DIAGNOSIS — R73.9 HYPERGLYCEMIA: ICD-10-CM

## 2022-05-26 DIAGNOSIS — I65.23 BILATERAL CAROTID ARTERY STENOSIS: ICD-10-CM

## 2022-05-26 DIAGNOSIS — I10 BENIGN ESSENTIAL HYPERTENSION: ICD-10-CM

## 2022-05-26 PROCEDURE — 99214 OFFICE O/P EST MOD 30 MIN: CPT | Performed by: INTERNAL MEDICINE

## 2022-05-26 ASSESSMENT — PATIENT HEALTH QUESTIONNAIRE - PHQ9
1. LITTLE INTEREST OR PLEASURE IN DOING THINGS: NOT AT ALL
SUM OF ALL RESPONSES TO PHQ9 QUESTIONS 1 AND 2: 0
CLINICAL INTERPRETATION OF PHQ2 SCORE: NO FURTHER SCREENING NEEDED
2. FEELING DOWN, DEPRESSED OR HOPELESS: NOT AT ALL
SUM OF ALL RESPONSES TO PHQ9 QUESTIONS 1 AND 2: 0

## 2022-06-21 ENCOUNTER — TELEPHONE (OUTPATIENT)
Dept: CARDIOLOGY | Age: 82
End: 2022-06-21

## 2022-06-22 ENCOUNTER — TELEPHONE (OUTPATIENT)
Dept: CARDIOLOGY | Age: 82
End: 2022-06-22

## 2022-06-28 RX ORDER — PRAVASTATIN SODIUM 40 MG
40 TABLET ORAL NIGHTLY
Qty: 90 TABLET | Refills: 3 | Status: SHIPPED | OUTPATIENT
Start: 2022-06-28 | End: 2023-06-27

## 2022-08-04 RX ORDER — OMEPRAZOLE 20 MG/1
CAPSULE, DELAYED RELEASE ORAL
Qty: 90 CAPSULE | Refills: 1 | Status: SHIPPED | OUTPATIENT
Start: 2022-08-04

## 2022-08-04 RX ORDER — METOPROLOL SUCCINATE 50 MG/1
TABLET, EXTENDED RELEASE ORAL
Qty: 180 TABLET | Refills: 0 | Status: SHIPPED | OUTPATIENT
Start: 2022-08-04 | End: 2022-11-01

## 2022-08-12 RX ORDER — LOSARTAN POTASSIUM 100 MG/1
TABLET ORAL
Qty: 90 TABLET | Refills: 1 | Status: SHIPPED | OUTPATIENT
Start: 2022-08-12 | End: 2023-01-31

## 2022-08-12 RX ORDER — AMLODIPINE BESYLATE 5 MG/1
TABLET ORAL
Qty: 90 TABLET | Refills: 1 | Status: SHIPPED | OUTPATIENT
Start: 2022-08-12 | End: 2023-01-31

## 2022-09-15 ENCOUNTER — ANCILLARY PROCEDURE (OUTPATIENT)
Dept: CARDIOLOGY | Age: 82
End: 2022-09-15
Attending: INTERNAL MEDICINE

## 2022-09-15 DIAGNOSIS — I65.23 BILATERAL CAROTID ARTERY STENOSIS: ICD-10-CM

## 2022-09-15 DIAGNOSIS — R73.9 HYPERGLYCEMIA: ICD-10-CM

## 2022-09-15 DIAGNOSIS — E78.00 HYPERCHOLESTEREMIA: ICD-10-CM

## 2022-09-15 DIAGNOSIS — I25.10 CORONARY ARTERY DISEASE INVOLVING NATIVE CORONARY ARTERY OF NATIVE HEART WITHOUT ANGINA PECTORIS: ICD-10-CM

## 2022-09-15 DIAGNOSIS — I10 BENIGN ESSENTIAL HYPERTENSION: ICD-10-CM

## 2022-09-15 PROCEDURE — 93880 EXTRACRANIAL BILAT STUDY: CPT | Performed by: INTERNAL MEDICINE

## 2022-09-19 ENCOUNTER — TELEPHONE (OUTPATIENT)
Dept: CARDIOLOGY | Age: 82
End: 2022-09-19

## 2022-09-20 ENCOUNTER — TELEPHONE (OUTPATIENT)
Dept: CARDIOLOGY | Age: 82
End: 2022-09-20

## 2022-11-01 RX ORDER — METOPROLOL SUCCINATE 50 MG/1
TABLET, EXTENDED RELEASE ORAL
Qty: 180 TABLET | Refills: 1 | Status: SHIPPED | OUTPATIENT
Start: 2022-11-01 | End: 2023-04-24

## 2022-11-28 ENCOUNTER — APPOINTMENT (OUTPATIENT)
Dept: CARDIOLOGY | Age: 82
End: 2022-11-28
Attending: INTERNAL MEDICINE

## 2022-11-29 ASSESSMENT — ENCOUNTER SYMPTOMS
HEMOPTYSIS: 0
CHILLS: 0
SUSPICIOUS LESIONS: 0
MEMORY LOSS: 1
FEVER: 0
WEIGHT GAIN: 0
COUGH: 0
BRUISES/BLEEDS EASILY: 0
HEMATOCHEZIA: 0
ALLERGIC/IMMUNOLOGIC COMMENTS: NO NEW FOOD ALLERGIES
WEAKNESS: 0
WEIGHT LOSS: 0

## 2022-12-01 ENCOUNTER — APPOINTMENT (OUTPATIENT)
Dept: CARDIOLOGY | Age: 82
End: 2022-12-01

## 2022-12-12 ENCOUNTER — ANCILLARY PROCEDURE (OUTPATIENT)
Dept: CARDIOLOGY | Age: 82
End: 2022-12-12
Attending: INTERNAL MEDICINE

## 2022-12-12 VITALS — BODY MASS INDEX: 27.97 KG/M2 | WEIGHT: 174 LBS | HEIGHT: 66 IN

## 2022-12-12 DIAGNOSIS — E78.00 HYPERCHOLESTEREMIA: ICD-10-CM

## 2022-12-12 DIAGNOSIS — I10 BENIGN ESSENTIAL HYPERTENSION: ICD-10-CM

## 2022-12-12 DIAGNOSIS — R73.9 HYPERGLYCEMIA: ICD-10-CM

## 2022-12-12 DIAGNOSIS — I65.23 BILATERAL CAROTID ARTERY STENOSIS: ICD-10-CM

## 2022-12-12 DIAGNOSIS — I25.10 CORONARY ARTERY DISEASE INVOLVING NATIVE CORONARY ARTERY OF NATIVE HEART WITHOUT ANGINA PECTORIS: ICD-10-CM

## 2022-12-12 LAB
HEART RATE RESERVE PREDICTED: 13.67 BPM
LV EF: 58 %
RESTING HR ACHIEVED: 57 BPM
STRESS BASELINE BP: NORMAL MMHG
STRESS PEAK HR: 120 BPM
STRESS PERCENT HR: 86 %
STRESS POST ESTIMATED WORKLOAD: 6.1 METS
STRESS POST EXERCISE DUR MIN: 5 MIN
STRESS POST EXERCISE DUR SEC: 51 SEC
STRESS POST PEAK BP: NORMAL MMHG
STRESS TARGET HR: 139 BPM

## 2022-12-12 PROCEDURE — 93015 CV STRESS TEST SUPVJ I&R: CPT | Performed by: INTERNAL MEDICINE

## 2022-12-12 PROCEDURE — 78452 HT MUSCLE IMAGE SPECT MULT: CPT | Performed by: INTERNAL MEDICINE

## 2022-12-12 PROCEDURE — A9502 TC99M TETROFOSMIN: HCPCS | Performed by: INTERNAL MEDICINE

## 2022-12-12 ASSESSMENT — EXERCISE STRESS TEST
PEAK_BP: 200/80
GRADE: 10
PEAK_METS: 6.1
STAGE_CATEGORIES: 3
PEAK_HR: 86
PEAK_RPP: 15308
PEAK_BP: 200/80
PEAK_RPP: 11250
MPH: 1.7
GRADE: 12
STAGE_CATEGORIES: 2
PEAK_HR: 95
PEAK_HR: 57
PEAK_HR: 120
STOPPAGE_REASON: GENERAL FATIGUE
STAGE_CATEGORIES: RECOVERY 2
PEAK_RPP: 9348
PEAK_HR: 120
PEAK_RPP: 24000
STAGE_CATEGORIES: RECOVERY 1
PEAK_BP: 150/74
PEAK_RPP: 14250
PEAK_RPP: 24000
PEAK_BP: 164/70
MPH: 2.5
PEAK_HR: 75
PEAK_BP: 150/66
PEAK_BP: 178/80
STAGE_CATEGORIES: RECOVERY 0
STAGE_CATEGORIES: RESTING

## 2022-12-15 ENCOUNTER — TELEPHONE (OUTPATIENT)
Dept: CARDIOLOGY | Age: 82
End: 2022-12-15

## 2023-01-05 ENCOUNTER — APPOINTMENT (OUTPATIENT)
Dept: CARDIOLOGY | Age: 83
End: 2023-01-05

## 2023-01-31 RX ORDER — OMEPRAZOLE 20 MG/1
20 CAPSULE, DELAYED RELEASE ORAL
Qty: 90 CAPSULE | Refills: 1 | Status: SHIPPED | OUTPATIENT
Start: 2023-01-31

## 2023-01-31 RX ORDER — AMLODIPINE BESYLATE 5 MG/1
TABLET ORAL
Qty: 90 TABLET | Refills: 0 | Status: SHIPPED | OUTPATIENT
Start: 2023-01-31 | End: 2023-04-24

## 2023-01-31 RX ORDER — LOSARTAN POTASSIUM 100 MG/1
TABLET ORAL
Qty: 90 TABLET | Refills: 0 | Status: SHIPPED | OUTPATIENT
Start: 2023-01-31 | End: 2023-04-24

## 2023-01-31 NOTE — TELEPHONE ENCOUNTER
Refill passed per [place] [facility] protocol.     Requested Prescriptions   Pending Prescriptions Disp Refills    OMEPRAZOLE 20 MG Oral Capsule Delayed Release [Pharmacy Med Name: Omeprazole 20 MG Oral Capsule Delayed Release] 90 capsule 0     Sig: TAKE 1 CAPSULE BY MOUTH BEFORE BREAKFAST       Gastrointestional Medication Protocol Passed - 1/31/2023 10:25 AM        Passed - In person appointment or virtual visit in the past 12 mos or appointment in next 3 mos     Recent Outpatient Visits              10 months ago Medicare annual wellness visit, subsequent    Morales Duffy MD    Office Visit    1 year ago Medicare annual wellness visit, subsequent    [address], [address], Morales Sharp MD    Office Visit    1 year ago High priority for COVID-19 virus vaccination    [address], [address], Morales Sharp MD    [employer] E[facility]M    3 years ago [facility] annual wellness visit, subsequent    [address], [address], Morales Sharp MD    Office Visit    3 years ago Acute bronchitis, unspecified organism    [address], [address], Morales Sharp MD    Office Visit                           Recent Outpatient Visits              10 months ago Medicare annual wellness visit, subsequent    Morales Duffy MD    Office Visit    1 year ago Medicare annual wellness visit, subsequent    Morales Duffy MD    Office Visit    1 year ago High priority for COVID-19 virus vaccination    Morales Duffy MD    [facility] E/M    [facility]    Morales Duffy MD Office Visit    3 years ago Acute bronchitis, unspecified organism    Kalee Carolina MD    Office Visit

## 2023-03-01 ASSESSMENT — ENCOUNTER SYMPTOMS
FEVER: 0
WEIGHT LOSS: 0
WEIGHT GAIN: 0
CHILLS: 0
MEMORY LOSS: 1
HEMATOCHEZIA: 0
WEAKNESS: 0
COUGH: 0
ALLERGIC/IMMUNOLOGIC COMMENTS: NO NEW FOOD ALLERGIES
BRUISES/BLEEDS EASILY: 0
SUSPICIOUS LESIONS: 0
HEMOPTYSIS: 0

## 2023-03-02 ENCOUNTER — OFFICE VISIT (OUTPATIENT)
Dept: CARDIOLOGY | Age: 83
End: 2023-03-02

## 2023-03-02 VITALS
HEIGHT: 66 IN | OXYGEN SATURATION: 99 % | HEART RATE: 59 BPM | BODY MASS INDEX: 27.74 KG/M2 | SYSTOLIC BLOOD PRESSURE: 158 MMHG | WEIGHT: 172.62 LBS | DIASTOLIC BLOOD PRESSURE: 64 MMHG

## 2023-03-02 DIAGNOSIS — R73.9 HYPERGLYCEMIA: ICD-10-CM

## 2023-03-02 DIAGNOSIS — I25.10 CORONARY ARTERY DISEASE INVOLVING NATIVE CORONARY ARTERY OF NATIVE HEART WITHOUT ANGINA PECTORIS: ICD-10-CM

## 2023-03-02 DIAGNOSIS — I10 BENIGN ESSENTIAL HYPERTENSION: ICD-10-CM

## 2023-03-02 DIAGNOSIS — E78.00 HYPERCHOLESTEREMIA: ICD-10-CM

## 2023-03-02 DIAGNOSIS — I77.811 ECTATIC ABDOMINAL AORTA (CMD): ICD-10-CM

## 2023-03-02 DIAGNOSIS — I25.10 ASHD (ARTERIOSCLEROTIC HEART DISEASE): Primary | ICD-10-CM

## 2023-03-02 DIAGNOSIS — I65.23 BILATERAL CAROTID ARTERY STENOSIS: ICD-10-CM

## 2023-03-02 DIAGNOSIS — M06.9 RHEUMATOID ARTHRITIS, INVOLVING UNSPECIFIED SITE, UNSPECIFIED WHETHER RHEUMATOID FACTOR PRESENT (CMD): ICD-10-CM

## 2023-03-02 PROCEDURE — 99214 OFFICE O/P EST MOD 30 MIN: CPT | Performed by: INTERNAL MEDICINE

## 2023-03-02 ASSESSMENT — PATIENT HEALTH QUESTIONNAIRE - PHQ9
1. LITTLE INTEREST OR PLEASURE IN DOING THINGS: NOT AT ALL
2. FEELING DOWN, DEPRESSED OR HOPELESS: NOT AT ALL
SUM OF ALL RESPONSES TO PHQ9 QUESTIONS 1 AND 2: 0
SUM OF ALL RESPONSES TO PHQ9 QUESTIONS 1 AND 2: 0
CLINICAL INTERPRETATION OF PHQ2 SCORE: NO FURTHER SCREENING NEEDED

## 2023-04-24 RX ORDER — AMLODIPINE BESYLATE 5 MG/1
TABLET ORAL
Qty: 90 TABLET | Refills: 3 | Status: SHIPPED | OUTPATIENT
Start: 2023-04-24 | End: 2023-04-24 | Stop reason: SDUPTHER

## 2023-04-24 RX ORDER — LOSARTAN POTASSIUM 100 MG/1
TABLET ORAL
Qty: 90 TABLET | Refills: 3 | Status: SHIPPED | OUTPATIENT
Start: 2023-04-24

## 2023-04-24 RX ORDER — AMLODIPINE BESYLATE 5 MG/1
5 TABLET ORAL DAILY
Qty: 90 TABLET | Refills: 3 | Status: SHIPPED | OUTPATIENT
Start: 2023-04-24 | End: 2023-11-08 | Stop reason: DRUGHIGH

## 2023-04-24 RX ORDER — METOPROLOL SUCCINATE 50 MG/1
TABLET, EXTENDED RELEASE ORAL
Qty: 180 TABLET | Refills: 3 | Status: SHIPPED | OUTPATIENT
Start: 2023-04-24

## 2023-05-09 ENCOUNTER — OFFICE VISIT (OUTPATIENT)
Dept: INTERNAL MEDICINE CLINIC | Facility: CLINIC | Age: 83
End: 2023-05-09

## 2023-05-09 VITALS
HEIGHT: 65.5 IN | SYSTOLIC BLOOD PRESSURE: 134 MMHG | DIASTOLIC BLOOD PRESSURE: 70 MMHG | HEART RATE: 59 BPM | BODY MASS INDEX: 28.86 KG/M2 | TEMPERATURE: 99 F | OXYGEN SATURATION: 98 % | WEIGHT: 175.31 LBS

## 2023-05-09 DIAGNOSIS — J45.40 MODERATE PERSISTENT ASTHMA WITHOUT COMPLICATION: ICD-10-CM

## 2023-05-09 DIAGNOSIS — H91.93 BILATERAL HEARING LOSS, UNSPECIFIED HEARING LOSS TYPE: ICD-10-CM

## 2023-05-09 DIAGNOSIS — R35.0 BENIGN PROSTATIC HYPERPLASIA WITH URINARY FREQUENCY: ICD-10-CM

## 2023-05-09 DIAGNOSIS — I10 ESSENTIAL HYPERTENSION: ICD-10-CM

## 2023-05-09 DIAGNOSIS — E78.00 PURE HYPERCHOLESTEROLEMIA: ICD-10-CM

## 2023-05-09 DIAGNOSIS — N40.1 BENIGN PROSTATIC HYPERPLASIA WITH URINARY FREQUENCY: ICD-10-CM

## 2023-05-09 DIAGNOSIS — I25.10 CORONARY ARTERY DISEASE INVOLVING NATIVE CORONARY ARTERY OF NATIVE HEART WITHOUT ANGINA PECTORIS: ICD-10-CM

## 2023-05-09 DIAGNOSIS — I65.29 CAROTID ATHEROSCLEROSIS, UNSPECIFIED LATERALITY: ICD-10-CM

## 2023-05-09 DIAGNOSIS — K63.5 POLYP OF COLON, UNSPECIFIED PART OF COLON, UNSPECIFIED TYPE: ICD-10-CM

## 2023-05-09 DIAGNOSIS — Z12.5 PROSTATE CANCER SCREENING: ICD-10-CM

## 2023-05-09 DIAGNOSIS — Z91.81 AT RISK FOR FALLS: ICD-10-CM

## 2023-05-09 DIAGNOSIS — H61.23 BILATERAL IMPACTED CERUMEN: ICD-10-CM

## 2023-05-09 DIAGNOSIS — Z00.00 MEDICARE ANNUAL WELLNESS VISIT, SUBSEQUENT: Primary | ICD-10-CM

## 2023-05-09 DIAGNOSIS — J30.9 ALLERGIC RHINITIS, UNSPECIFIED SEASONALITY, UNSPECIFIED TRIGGER: ICD-10-CM

## 2023-05-09 PROCEDURE — G0439 PPPS, SUBSEQ VISIT: HCPCS | Performed by: INTERNAL MEDICINE

## 2023-05-09 PROCEDURE — 1126F AMNT PAIN NOTED NONE PRSNT: CPT | Performed by: INTERNAL MEDICINE

## 2023-05-09 RX ORDER — LOSARTAN POTASSIUM 100 MG/1
100 TABLET ORAL DAILY
COMMUNITY
Start: 2023-04-24

## 2023-05-09 RX ORDER — MONTELUKAST SODIUM 10 MG/1
10 TABLET ORAL DAILY
COMMUNITY
Start: 2023-04-03

## 2023-05-10 ENCOUNTER — LAB ENCOUNTER (OUTPATIENT)
Dept: LAB | Age: 83
End: 2023-05-10
Attending: INTERNAL MEDICINE
Payer: MEDICARE

## 2023-05-10 ENCOUNTER — EXTERNAL LAB (OUTPATIENT)
Dept: HEALTH INFORMATION MANAGEMENT | Facility: OTHER | Age: 83
End: 2023-05-10

## 2023-05-10 DIAGNOSIS — E78.00 PURE HYPERCHOLESTEROLEMIA: ICD-10-CM

## 2023-05-10 DIAGNOSIS — Z00.00 MEDICARE ANNUAL WELLNESS VISIT, SUBSEQUENT: ICD-10-CM

## 2023-05-10 LAB
ALBUMIN SERPL-MCNC: 3.7 G/DL (ref 3.4–5)
ALBUMIN SERPL-MCNC: 3.7 G/DL (ref 3.4–5)
ALBUMIN/GLOB SERPL: 0.9 {RATIO} (ref 1–2)
ALBUMIN/GLOB SERPL: 0.9 {RATIO} (ref 1–2)
ALP LIVER SERPL-CCNC: 51 U/L
ALP SERPL-CCNC: 61 U/L (ref 45–117)
ALT SERPL-CCNC: 23 U/L
ALT SERPL-CCNC: 23 U/L (ref 16–61)
ANION GAP SERPL CALC-SCNC: 5 MMOL/L (ref 0–18)
ANION GAP SERPL CALC-SCNC: 5 MMOL/L (ref 0–18)
AST SERPL-CCNC: 20 U/L (ref 15–37)
AST SERPL-CCNC: 20 U/L (ref 15–37)
BASOPHILS # BLD AUTO: 0.04 X10(3) UL (ref 0–0.2)
BASOPHILS NFR BLD AUTO: 0.7 %
BILIRUB SERPL-MCNC: 0.4 MG/DL (ref 0.1–2)
BILIRUB SERPL-MCNC: 0.4 MG/DL (ref 0.1–2)
BUN BLD-MCNC: 14 MG/DL (ref 7–18)
BUN SERPL-MCNC: 14 MG/DL (ref 7–18)
BUN/CREAT SERPL: 14.9 (ref 10–20)
BUN/CREAT SERPL: 14.9 (ref 10–20)
CALCIUM BLD-MCNC: 9.2 MG/DL (ref 8.5–10.1)
CALCIUM SERPL-MCNC: 9.2 MG/DL (ref 8.5–10.1)
CALCULATED OSMO: 288 MOSM/KG (ref 275–295)
CHLORIDE SERPL-SCNC: 106 MMOL/L (ref 98–112)
CHLORIDE SERPL-SCNC: 106 MMOL/L (ref 98–112)
CHOLEST SERPL-MCNC: 117 MG/DL
CHOLEST SERPL-MCNC: 117 MG/DL (ref ?–200)
CO2 SERPL-SCNC: 28 MMOL/L (ref 21–32)
CO2 SERPL-SCNC: 28 MMOL/L (ref 21–32)
CREAT BLD-MCNC: 0.94 MG/DL
CREAT SERPL-MCNC: 0.94 MG/DL (ref 0.7–1.3)
DEPRECATED RDW RBC AUTO: 41.7 FL (ref 35.1–46.3)
EOSINOPHIL # BLD AUTO: 0.52 X10(3) UL (ref 0–0.7)
EOSINOPHIL NFR BLD AUTO: 8.9 %
ERYTHROCYTE [DISTWIDTH] IN BLOOD BY AUTOMATED COUNT: 12.9 % (ref 11–15)
FASTING PATIENT LIPID ANSWER: YES
FASTING STATUS PATIENT QL REPORTED: YES
GFR SERPLBLD BASED ON 1.73 SQ M-ARVRAT: 81 ML/MIN/1.73M2 (ref 60–?)
GFR SERPLBLD SCHWARTZ-ARVRAT: 81 ML/MIN/1.73M2
GLOBULIN PLAS-MCNC: 4.2 G/DL (ref 2.8–4.4)
GLOBULIN SER-MCNC: 4.2 G/DL (ref 2.8–4.4)
GLUCOSE BLD-MCNC: 94 MG/DL (ref 70–99)
GLUCOSE SERPL-MCNC: 94 MG/DL (ref 70–99)
HCT VFR BLD AUTO: 38.2 %
HDLC SERPL-MCNC: 44 MG/DL (ref 40–59)
HDLC SERPL-MCNC: 44 MG/DL (ref 40–59)
HGB BLD-MCNC: 12.4 G/DL
IMM GRANULOCYTES # BLD AUTO: 0.02 X10(3) UL (ref 0–1)
IMM GRANULOCYTES NFR BLD: 0.3 %
LDLC SERPL CALC-MCNC: 57 MG/DL
LDLC SERPL CALC-MCNC: 57 MG/DL (ref ?–100)
LENGTH OF FAST TIME PATIENT: YES H
LENGTH OF FAST TIME PATIENT: YES H
LYMPHOCYTES # BLD AUTO: 1.55 X10(3) UL (ref 1–4)
LYMPHOCYTES NFR BLD AUTO: 26.5 %
MCH RBC QN AUTO: 28.7 PG (ref 26–34)
MCHC RBC AUTO-ENTMCNC: 32.5 G/DL (ref 31–37)
MCV RBC AUTO: 88.4 FL
MONOCYTES # BLD AUTO: 0.52 X10(3) UL (ref 0.1–1)
MONOCYTES NFR BLD AUTO: 8.9 %
NEUTROPHILS # BLD AUTO: 3.2 X10 (3) UL (ref 1.5–7.7)
NEUTROPHILS # BLD AUTO: 3.2 X10(3) UL (ref 1.5–7.7)
NEUTROPHILS NFR BLD AUTO: 54.7 %
NONHDLC SERPL-MCNC: 73 MG/DL
NONHDLC SERPL-MCNC: 73 MG/DL (ref ?–130)
OSMOLALITY SERPL CALC.SUM OF ELEC: 288 MOSM/KG (ref 275–295)
PLATELET # BLD AUTO: 231 10(3)UL (ref 150–450)
POTASSIUM SERPL-SCNC: 4.2 MMOL/L (ref 3.5–5.1)
POTASSIUM SERPL-SCNC: 4.2 MMOL/L (ref 3.5–5.1)
PROT SERPL-MCNC: 7.9 G/DL (ref 6.4–8.2)
PROT SERPL-MCNC: 7.9 G/DL (ref 6.4–8.2)
RBC # BLD AUTO: 4.32 X10(6)UL
SODIUM SERPL-SCNC: 139 MMOL/L (ref 136–145)
SODIUM SERPL-SCNC: 139 MMOL/L (ref 136–145)
TRIGL SERPL-MCNC: 82 MG/DL (ref 30–149)
TRIGL SERPL-MCNC: 82 MG/DL (ref 30–149)
VLDLC SERPL CALC-MCNC: 12 MG/DL (ref 0–30)
VLDLC SERPL CALC-MCNC: 12 MG/DL (ref 0–30)
WBC # BLD AUTO: 5.9 X10(3) UL (ref 4–11)

## 2023-05-10 PROCEDURE — 85025 COMPLETE CBC W/AUTO DIFF WBC: CPT

## 2023-05-10 PROCEDURE — 80061 LIPID PANEL: CPT

## 2023-05-10 PROCEDURE — 80053 COMPREHEN METABOLIC PANEL: CPT

## 2023-05-10 PROCEDURE — 36415 COLL VENOUS BLD VENIPUNCTURE: CPT

## 2023-05-14 ENCOUNTER — TELEPHONE (OUTPATIENT)
Dept: INTERNAL MEDICINE CLINIC | Facility: CLINIC | Age: 83
End: 2023-05-14

## 2023-05-14 DIAGNOSIS — D64.9 NORMOCYTIC ANEMIA: Primary | ICD-10-CM

## 2023-05-16 ENCOUNTER — EXTERNAL LAB (OUTPATIENT)
Dept: OTHER | Age: 83
End: 2023-05-16

## 2023-05-16 ENCOUNTER — TELEPHONE (OUTPATIENT)
Dept: INTERNAL MEDICINE CLINIC | Facility: CLINIC | Age: 83
End: 2023-05-16

## 2023-05-16 ENCOUNTER — EXTERNAL LAB (OUTPATIENT)
Dept: HEALTH INFORMATION MANAGEMENT | Facility: OTHER | Age: 83
End: 2023-05-16

## 2023-05-16 ENCOUNTER — LAB ENCOUNTER (OUTPATIENT)
Dept: LAB | Age: 83
End: 2023-05-16
Attending: INTERNAL MEDICINE
Payer: MEDICARE

## 2023-05-16 DIAGNOSIS — D64.9 NORMOCYTIC ANEMIA: ICD-10-CM

## 2023-05-16 LAB
DEPRECATED HBV CORE AB SER IA-ACNC: 57.2 NG/ML
FOLATE SERPL-MCNC: 17.9 NG/ML (ref 8.7–?)
IRON SATN MFR SERPL: 25 %
IRON SERPL-MCNC: 83 UG/DL
LAB RESULT: NORMAL
LAB RESULT: NORMAL
TIBC SERPL-MCNC: 332 UG/DL (ref 240–450)
TRANSFERRIN SERPL-MCNC: 223 MG/DL (ref 200–360)
VIT B12 SERPL-MCNC: 886 PG/ML (ref 193–986)

## 2023-05-16 PROCEDURE — 84466 ASSAY OF TRANSFERRIN: CPT

## 2023-05-16 PROCEDURE — 83540 ASSAY OF IRON: CPT

## 2023-05-16 PROCEDURE — 36415 COLL VENOUS BLD VENIPUNCTURE: CPT | Performed by: INTERNAL MEDICINE

## 2023-05-16 PROCEDURE — 82607 VITAMIN B-12: CPT | Performed by: INTERNAL MEDICINE

## 2023-05-16 PROCEDURE — 82728 ASSAY OF FERRITIN: CPT

## 2023-05-16 PROCEDURE — 82746 ASSAY OF FOLIC ACID SERUM: CPT | Performed by: INTERNAL MEDICINE

## 2023-05-16 NOTE — TELEPHONE ENCOUNTER
Patient is requesting a call back from Dr. Liban Pack.  Patient would like to go over his lab results, regarding the low iron result and other labs.     Please call patient on [phone] to discuss

## 2023-05-22 ENCOUNTER — TELEPHONE (OUTPATIENT)
Dept: INTERNAL MEDICINE CLINIC | Facility: CLINIC | Age: 83
End: 2023-05-22

## 2023-05-23 ENCOUNTER — OFFICE VISIT (OUTPATIENT)
Dept: OTOLARYNGOLOGY | Facility: CLINIC | Age: 83
End: 2023-05-23

## 2023-05-23 VITALS — HEIGHT: 65.5 IN | TEMPERATURE: 97 F | BODY MASS INDEX: 28.86 KG/M2 | WEIGHT: 175.31 LBS

## 2023-05-23 DIAGNOSIS — H61.23 BILATERAL IMPACTED CERUMEN: Primary | ICD-10-CM

## 2023-05-23 PROCEDURE — 99202 OFFICE O/P NEW SF 15 MIN: CPT | Performed by: OTOLARYNGOLOGY

## 2023-06-27 ENCOUNTER — CLINICAL DOCUMENTATION (OUTPATIENT)
Dept: CARDIOLOGY | Age: 83
End: 2023-06-27

## 2023-06-27 RX ORDER — PRAVASTATIN SODIUM 40 MG
40 TABLET ORAL NIGHTLY
Qty: 90 TABLET | Refills: 3 | Status: SHIPPED | OUTPATIENT
Start: 2023-06-27

## 2023-07-21 RX ORDER — OMEPRAZOLE 20 MG/1
20 CAPSULE, DELAYED RELEASE ORAL
Qty: 90 CAPSULE | Refills: 0 | Status: SHIPPED | OUTPATIENT
Start: 2023-07-21

## 2023-07-21 NOTE — TELEPHONE ENCOUNTER
Refill passed per [place] [employer] [place] protocol.      Requested Prescriptions   Pending Prescriptions Disp Refills    OMEPRAZOLE 20 MG Oral Capsule Delayed Release [Pharmacy Med Name: Omeprazole 20 MG Oral Capsule Delayed Release] 90 capsule 0     Sig: TAKE 1 CAPSULE BY MOUTH BEFORE BREAKFAST       Gastrointestional Medication Protocol Passed - 7/20/2023 10:20 AM        Passed - In person appointment or virtual visit in the past 12 mos or appointment in next 3 mos     Recent Outpatient Visits              1 month ago Bilateral impacted cerumen    [place], [address], Darnell Andrews MD    Office Visit    2 months ago [employer] annual wellness visit, subsequent    [address], [place]at Knowles MD    Office Visit    1 year ago Medicare annual wellness visit, subsequent    [address], [place]at Knowles MD    Office Visit    2 years ago [employer] annual wellness visit, subsequent    [address], [address], Mont[place]errat Knowles MD    Office Visit    2 years ago High priority for COVID-19 virus vaccination    [address], [place]at Knowles MD    [employer] E/M                            Recent Outpatient Visits              1 month ago Bilateral impacted Randell David, [address], Darnell Andrews MD    Office Visit    2 months ago Medicare annual wellness visit, subsequent    [address], [place]errat Knowles MD    Office Visit    1 year ago Medicare annual wellness visit, subsequent    [address], [place]at Knowles MD    Office Visit    2 years ago [employer] annual wellness visit, subsequent    [address], [place]at Knowles MD    Office Visit    2 years ago High priority for COVID-19 virus vaccination    Alexis Bueno MD    Virtual Phone E/M

## 2023-10-27 RX ORDER — OMEPRAZOLE 20 MG/1
20 CAPSULE, DELAYED RELEASE ORAL
Qty: 90 CAPSULE | Refills: 3 | Status: SHIPPED | OUTPATIENT
Start: 2023-10-27

## 2023-10-27 NOTE — TELEPHONE ENCOUNTER
Refill passed per [place] [employer] [facility], [facility] protocol.     Requested Prescriptions   Pending Prescriptions Disp Refills    OMEPRAZOLE 20 MG Oral Capsule Delayed Release [Pharmacy Med Name: Omeprazole 20 MG Oral Capsule Delayed Release] 90 capsule 0     Sig: TAKE 1 CAPSULE BY MOUTH BEFORE BREAKFAST       Gastrointestional Medication Protocol Passed - 10/26/2023  5:54 PM        Passed - In person appointment or virtual visit in the past 12 mos or appointment in next 3 mos     Recent Outpatient Visits              5 months ago Bilateral impacted cerumen    [facility], [address], Odalys Andrews MD    Office Visit    5 months ago [employer] annual wellness visit, subsequent    Inez Larose MD    Office Visit    1 year ago Medicare annual wellness visit, subsequent    Inez Larose MD    Office Visit    2 years ago [employer] annual wellness visit, subsequent    [facility], [address], Inez Sharp MD    Office Visit    2 years ago High priority for COVID-19 virus vaccination    Alexis Larose MD    Virtual Phone E/M                           Recent Outpatient Visits              5 months ago Bilateral impacted cerumen    [facility], [address], Odalys Andrews MD    Office Visit    5 months ago [employer] annual wellness visit, subsequent    Inez Larose MD    Office Visit    1 year ago Medicare annual wellness visit, subsequent    Inez Larose MD    Office Visit    2 years ago [employer] annual wellness visit, subsequent    Inez Larose MD    Office Visit    2 years ago High priority for COVID-19 virus vaccination    [address] [facility], Alexis Braga MD    Virtual Phone E/M

## 2023-11-05 ASSESSMENT — ENCOUNTER SYMPTOMS
MEMORY LOSS: 1
FEVER: 0
HEMATOCHEZIA: 0
HEMOPTYSIS: 0
COUGH: 0
SUSPICIOUS LESIONS: 0
WEIGHT LOSS: 0
ALLERGIC/IMMUNOLOGIC COMMENTS: NO NEW FOOD ALLERGIES
WEIGHT GAIN: 0
CHILLS: 0
BRUISES/BLEEDS EASILY: 0
WEAKNESS: 0

## 2023-11-08 ENCOUNTER — OFFICE VISIT (OUTPATIENT)
Dept: CARDIOLOGY | Age: 83
End: 2023-11-08

## 2023-11-08 VITALS
WEIGHT: 174 LBS | BODY MASS INDEX: 28.08 KG/M2 | DIASTOLIC BLOOD PRESSURE: 54 MMHG | HEART RATE: 60 BPM | SYSTOLIC BLOOD PRESSURE: 165 MMHG

## 2023-11-08 DIAGNOSIS — I10 BENIGN ESSENTIAL HYPERTENSION: Primary | ICD-10-CM

## 2023-11-08 DIAGNOSIS — E78.00 HYPERCHOLESTEREMIA: ICD-10-CM

## 2023-11-08 DIAGNOSIS — I77.811 ECTATIC ABDOMINAL AORTA (CMD): ICD-10-CM

## 2023-11-08 DIAGNOSIS — I25.10 CORONARY ARTERY DISEASE INVOLVING NATIVE CORONARY ARTERY OF NATIVE HEART WITHOUT ANGINA PECTORIS: ICD-10-CM

## 2023-11-08 DIAGNOSIS — M06.9 RHEUMATOID ARTHRITIS, INVOLVING UNSPECIFIED SITE, UNSPECIFIED WHETHER RHEUMATOID FACTOR PRESENT (CMD): ICD-10-CM

## 2023-11-08 DIAGNOSIS — I65.23 BILATERAL CAROTID ARTERY STENOSIS: ICD-10-CM

## 2023-11-08 PROCEDURE — 99214 OFFICE O/P EST MOD 30 MIN: CPT | Performed by: INTERNAL MEDICINE

## 2023-11-08 RX ORDER — AMLODIPINE BESYLATE 10 MG/1
10 TABLET ORAL DAILY
COMMUNITY

## 2023-11-08 SDOH — HEALTH STABILITY: PHYSICAL HEALTH: ON AVERAGE, HOW MANY DAYS PER WEEK DO YOU ENGAGE IN MODERATE TO STRENUOUS EXERCISE (LIKE A BRISK WALK)?: 7 DAYS

## 2023-11-08 SDOH — HEALTH STABILITY: PHYSICAL HEALTH: ON AVERAGE, HOW MANY MINUTES DO YOU ENGAGE IN EXERCISE AT THIS LEVEL?: 10 MIN

## 2023-11-08 ASSESSMENT — PATIENT HEALTH QUESTIONNAIRE - PHQ9
CLINICAL INTERPRETATION OF PHQ2 SCORE: NO FURTHER SCREENING NEEDED
SUM OF ALL RESPONSES TO PHQ9 QUESTIONS 1 AND 2: 0
2. FEELING DOWN, DEPRESSED OR HOPELESS: NOT AT ALL
1. LITTLE INTEREST OR PLEASURE IN DOING THINGS: NOT AT ALL
SUM OF ALL RESPONSES TO PHQ9 QUESTIONS 1 AND 2: 0

## 2024-01-12 RX ORDER — AMLODIPINE BESYLATE 5 MG/1
TABLET ORAL
Qty: 90 TABLET | Refills: 0 | OUTPATIENT
Start: 2024-01-12

## 2024-01-15 ENCOUNTER — TELEPHONE (OUTPATIENT)
Dept: CARDIOLOGY | Age: 84
End: 2024-01-15

## 2024-01-15 RX ORDER — AMLODIPINE BESYLATE 10 MG/1
10 TABLET ORAL DAILY
Qty: 90 TABLET | Refills: 3 | Status: SHIPPED | OUTPATIENT
Start: 2024-01-15

## 2024-02-22 ENCOUNTER — OFFICE VISIT (OUTPATIENT)
Dept: INTERNAL MEDICINE CLINIC | Facility: CLINIC | Age: 84
End: 2024-02-22

## 2024-02-22 ENCOUNTER — TELEPHONE (OUTPATIENT)
Dept: PULMONOLOGY | Facility: CLINIC | Age: 84
End: 2024-02-22

## 2024-02-22 VITALS
DIASTOLIC BLOOD PRESSURE: 61 MMHG | HEART RATE: 62 BPM | WEIGHT: 174 LBS | BODY MASS INDEX: 28.64 KG/M2 | SYSTOLIC BLOOD PRESSURE: 151 MMHG | HEIGHT: 65.5 IN

## 2024-02-22 DIAGNOSIS — J45.51 SEVERE PERSISTENT ASTHMA WITH ACUTE EXACERBATION (HCC): Primary | ICD-10-CM

## 2024-02-22 PROCEDURE — 99214 OFFICE O/P EST MOD 30 MIN: CPT | Performed by: STUDENT IN AN ORGANIZED HEALTH CARE EDUCATION/TRAINING PROGRAM

## 2024-02-22 RX ORDER — TIOTROPIUM BROMIDE 18 UG/1
18 CAPSULE ORAL; RESPIRATORY (INHALATION) DAILY
Qty: 90 CAPSULE | Refills: 3 | Status: SHIPPED | OUTPATIENT
Start: 2024-02-22 | End: 2025-02-16

## 2024-02-22 RX ORDER — DOXYCYCLINE HYCLATE 100 MG/1
100 CAPSULE ORAL 2 TIMES DAILY WITH MEALS
Qty: 20 CAPSULE | Refills: 0 | Status: SHIPPED | OUTPATIENT
Start: 2024-02-22 | End: 2024-03-03

## 2024-02-22 RX ORDER — FLUTICASONE PROPIONATE AND SALMETEROL 250; 50 UG/1; UG/1
1 POWDER RESPIRATORY (INHALATION) EVERY 12 HOURS
COMMUNITY

## 2024-02-22 RX ORDER — PREDNISONE 10 MG/1
TABLET ORAL
Qty: 30 TABLET | Refills: 0 | Status: SHIPPED | OUTPATIENT
Start: 2024-02-22 | End: 2024-03-05

## 2024-02-22 NOTE — TELEPHONE ENCOUNTER
Looked at Dr He's schedule and there are sooner appointments charissa. I called and left message for patient to call back to schedule sooner appointment.

## 2024-02-22 NOTE — TELEPHONE ENCOUNTER
Pi was given first available appointment for a consult in  Pulmonology schedule May 31 with Dr He. Pt is asking to be seen sooner. Pt was put on the wait list.  Please advise

## 2024-02-22 NOTE — PROGRESS NOTES
OFFICE NOTE     Patient ID: Dante Morrison is a 83 year old male.  Today's Date: 02/22/24  Chief Complaint: Cough      Pt is a 84y/o male with Pmhx of HTN, Bilateral carotid artery arthrosclerosis, COPD [place]/ Dr. Humberto Lorenzo,BPH, Allergic Rhinitis, Bilateral hearing loss, Fall risk,presents for ongoing cough.   Pt taking Albuterol HFA, on Fluticasone/Salmeterol, Montelukast 10mg daily and Wixella 250/10 1 puff BID.     Symbicort no longer covered. Back in January 26th, 2024 but had issues due to deductible coverage. Has had worsening cough sinc switching to Wixella    Former smoker Quit well over +30 years ago, environmental exposure.     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     Vitals:    02/22/24 1305 02/22/24 1307   BP: 159/61 151/61   Pulse: 63 62   Weight: 174 lb (78.9 kg)    Height: 5' 5.5\" (1.664 m)      body mass index is 28.51 kg/m².  BP Readings from Last 3 Encounters:   02/22/24 151/61   05/09/23 134/70   03/30/22 134/74     The ASCVD Risk score (Lobo DK, et al., 2019) failed to calculate for the following reasons:    The 2019 ASCVD risk score is only valid for ages 40 to 79      Medications reviewed:  Current Outpatient Medications   Medication Sig Dispense Refill    fluticasone-salmeterol 250-50 MCG/ACT Inhalation Aerosol Powder, Breath Activated Inhale 1 puff into the lungs Q12H.      predniSONE 10 MG Oral Tab Take 4 tablets (40 mg total) by mouth daily for 3 days, THEN 3 tablets (30 mg total) daily for 3 days, THEN 2 tablets (20 mg total) daily for 3 days, THEN 1 tablet (10 mg total) daily for 3 days. TAKE WITH FOOD.. 30 tablet 0    doxycycline 100 MG Oral Cap Take 1 capsule (100 mg total) by mouth 2 (two) times daily with meals for 10 days. 20 capsule 0    tiotropium (SPIRIVA HANDIHALER) 18 MCG Inhalation Cap Inhale 1 capsule (18 mcg total) into the lungs daily. 90 capsule 3    omeprazole 20 MG Oral Capsule Delayed Release Take 1 capsule (20 mg  total) by mouth before breakfast. 90 capsule 3    montelukast 10 MG Oral Tab Take 1 tablet (10 mg total) by mouth daily.      Ascorbic Acid (VITAMIN C OR) Take by mouth.      Ergocalciferol (VITAMIN D OR) Take by mouth.      Cyanocobalamin (VITAMIN B12 OR) Take by mouth.      Fexofenadine HCl (ALLEGRA OR)       AmLODIPine Besylate (NORVASC) 5 MG Oral Tab   0    Albuterol Sulfate HFA (PROAIR HFA) 108 (90 BASE) MCG/ACT Inhalation Aero Soln Inhale 2 puffs into the lungs every 6 (six) hours as needed for Wheezing. 1 Inhaler 0    aspirin 81 MG Oral Tab Take 1 tablet (81 mg total) by mouth daily. Twice daily      pravastatin (PRAVACHOL) 40 MG Oral Tab Take 1 tablet (40 mg total) by mouth daily.      Metoprolol Tartrate 50 MG OR TABS 2x daily      losartan 100 MG Oral Tab Take 1 tablet (100 mg total) by mouth daily.      LOSARTAN POTASSIUM 50 MG Oral Tab TAKE ONE TABLET BY MOUTH ONCE DAILY 90 tablet 1    Budesonide-Formoterol Fumarate 160-4.5 MCG/ACT Inhalation Aerosol Inhale 2 puffs into the lungs every morning. (Patient not taking: Reported on 3/30/2022)      docusate sodium 100 MG Oral Cap daily           Assessment & Plan    1. Severe persistent asthma with acute exacerbation (HCC) (Primary)  -     predniSONE; Take 4 tablets (40 mg total) by mouth daily for 3 days, THEN 3 tablets (30 mg total) daily for 3 days, THEN 2 tablets (20 mg total) daily for 3 days, THEN 1 tablet (10 mg total) daily for 3 days. TAKE WITH FOOD..  Dispense: 30 tablet; Refill: 0  -     PULMONARY - INTERNAL  -     Doxycycline Hyclate; Take 1 capsule (100 mg total) by mouth 2 (two) times daily with meals for 10 days.  Dispense: 20 capsule; Refill: 0  -     Tiotropium Bromide Monohydrate; Inhale 1 capsule (18 mcg total) into the lungs daily.  Dispense: 90 capsule; Refill: 3  Pt with Chronic Cough (>8 weeks) severe Asthma, and worsening symptoms since started on Wixella since apparently insurance company decided GDMT appropriate for patient is  changing his chronic inhaler after the new year.   -start doxycyline 100mg po BID for 10days for atypical coverage.   -Given severity / given PMHx of Asthma, will start prednisone 40mg for 3 days and taper by 10mg every 3 days  -Noted Wheezing and/or post viral cough will need to start rescue Albuterol HFA and ICS/LABA inhalation (covered by insurance) and follow up within the next couple to weeks.    -Given severity of symptoms will start Tiotropium inhalation daily  -Pt and wife would prefer to established with [facility] Pulmonologist for second opinion and management for Guideline directed therapy for Asthma (?occupational exposure) and benefit from further pulm evaluation    Follow Up: As needed/if symptoms worsen or Return in about 2 weeks (around 3/7/2024), or if symptoms worsen or fail to improve..       I spent 33 minutes obtaining pertitent medical history, reviewing pertinent imaging/labs and specialists notes, evaluating patient, discussing differential diagnosis' and various treatment options, reinforcing importance of compliance with treatment plan, and completing documentation.      Objective/ Results:   Physical Exam  Constitutional:       Appearance: He is well-developed.   Cardiovascular:      Rate and Rhythm: Normal rate and regular rhythm.      Heart sounds: Normal heart sounds.   Pulmonary:      Effort: Pulmonary effort is normal.      Breath sounds: Wheezing (bilateral expiratory) and rhonchi present.   Abdominal:      General: Bowel sounds are normal.      Palpations: Abdomen is soft.   Skin:     General: Skin is warm and dry.   Neurological:      Mental Status: He is alert and oriented to person, place, and time.      Deep Tendon Reflexes: Reflexes are normal and symmetric.        Reviewed:    Patient Active Problem List    Diagnosis    Carotid atherosclerosis    At risk for falls    Bilateral hearing loss    Essential hypertension    Need for vaccination    Coronary artery disease involving native  coronary artery of native heart without angina pectoris    Moderate persistent asthma without complication (HCC)    Hyperlipidemia    Benign prostatic hyperplasia    Allergic rhinitis      Allergies   Allergen Reactions    Augmentin [Amoxicillin-Pot Clavulanate] SWELLING     Pt claims causes swelling in his throat    Biaxin [Clarithromycin]     Clarithromycin SWELLING     Biaxin: swelling of feet    Lisinopril Coughing     Oral        Social History     Socioeconomic History    Marital status:    Tobacco Use    Smoking status: Former     Packs/day: 1.00     Years: 3.00     Additional pack years: 0.00     Total pack years: 3.00     Types: Cigarettes     Quit date: 1968     Years since quittin.1     Passive exposure: Past    Smokeless tobacco: Never   Vaping Use    Vaping Use: Never used   Substance and Sexual Activity    Alcohol use: No     Alcohol/week: 0.0 standard drinks of alcohol    Drug use: No   Other Topics Concern    Caffeine Concern Yes     Comment: coffee, 1 cup      Review of Systems   Constitutional: Negative.    Respiratory: Negative.     Cardiovascular: Negative.    Gastrointestinal: Negative.    Skin: Negative.    Neurological: Negative.      All other systems negative unless otherwise stated in ROS or HPI above.       Mike Robles MD  Internal Medicine       Call office with any questions or seek emergency care if necessary.   Patient understands and agrees to follow directions and advice.      ----------------------------------------- PATIENT INSTRUCTIONS-----------------------------------------     There are no Patient Instructions on file for this visit.

## 2024-03-04 NOTE — TELEPHONE ENCOUNTER
RN left msg for pt. Need confirmation that pt wants to reschedule appt w/ Dr. He on 5/31 2 pm to 3/29 4 pm.

## 2024-03-22 NOTE — TELEPHONE ENCOUNTER
Left detailed msg requesting appt confirmation w/ Dr. He on 3/29 4 pm or appt will be cancelled shortly. Contact info provided.

## 2024-04-15 RX ORDER — METOPROLOL SUCCINATE 50 MG/1
TABLET, EXTENDED RELEASE ORAL
Qty: 180 TABLET | Refills: 0 | Status: SHIPPED | OUTPATIENT
Start: 2024-04-15

## 2024-04-24 RX ORDER — LOSARTAN POTASSIUM 100 MG/1
TABLET ORAL
Qty: 90 TABLET | Refills: 0 | Status: SHIPPED | OUTPATIENT
Start: 2024-04-24

## 2024-05-10 ENCOUNTER — TELEPHONE (OUTPATIENT)
Dept: CARDIOLOGY | Age: 84
End: 2024-05-10

## 2024-05-10 RX ORDER — LOSARTAN POTASSIUM 100 MG/1
TABLET ORAL
Qty: 90 TABLET | Refills: 0 | OUTPATIENT
Start: 2024-05-10

## 2024-05-10 RX ORDER — LOSARTAN POTASSIUM 100 MG/1
100 TABLET ORAL DAILY
Qty: 90 TABLET | Refills: 0 | Status: SHIPPED | OUTPATIENT
Start: 2024-05-10

## 2024-05-16 ENCOUNTER — APPOINTMENT (OUTPATIENT)
Dept: CARDIOLOGY | Age: 84
End: 2024-05-16

## 2024-06-24 ENCOUNTER — TELEPHONE (OUTPATIENT)
Dept: CARDIOLOGY | Age: 84
End: 2024-06-24

## 2024-06-24 RX ORDER — PRAVASTATIN SODIUM 40 MG
40 TABLET ORAL NIGHTLY
Qty: 90 TABLET | Refills: 0 | Status: SHIPPED | OUTPATIENT
Start: 2024-06-24

## 2024-07-19 ASSESSMENT — ENCOUNTER SYMPTOMS
ALLERGIC/IMMUNOLOGIC COMMENTS: NO NEW FOOD ALLERGIES
WEAKNESS: 0
WEIGHT LOSS: 0
COUGH: 0
BRUISES/BLEEDS EASILY: 0
SUSPICIOUS LESIONS: 0
HEMATOCHEZIA: 0
HEMOPTYSIS: 0
FEVER: 0
WEIGHT GAIN: 0
MEMORY LOSS: 1
CHILLS: 0

## 2024-07-22 RX ORDER — METOPROLOL SUCCINATE 50 MG/1
TABLET, EXTENDED RELEASE ORAL
Qty: 180 TABLET | Refills: 0 | OUTPATIENT
Start: 2024-07-22

## 2024-07-23 ENCOUNTER — TELEPHONE (OUTPATIENT)
Dept: CARDIOLOGY | Age: 84
End: 2024-07-23

## 2024-07-23 ENCOUNTER — APPOINTMENT (OUTPATIENT)
Dept: CARDIOLOGY | Age: 84
End: 2024-07-23

## 2024-07-23 VITALS
BODY MASS INDEX: 27.84 KG/M2 | HEART RATE: 65 BPM | SYSTOLIC BLOOD PRESSURE: 134 MMHG | DIASTOLIC BLOOD PRESSURE: 62 MMHG | WEIGHT: 172.51 LBS

## 2024-07-23 DIAGNOSIS — I77.811 ECTATIC ABDOMINAL AORTA (CMD): ICD-10-CM

## 2024-07-23 DIAGNOSIS — I65.23 BILATERAL CAROTID ARTERY STENOSIS: ICD-10-CM

## 2024-07-23 DIAGNOSIS — I10 BENIGN ESSENTIAL HYPERTENSION: ICD-10-CM

## 2024-07-23 DIAGNOSIS — I25.10 ASHD (ARTERIOSCLEROTIC HEART DISEASE): ICD-10-CM

## 2024-07-23 DIAGNOSIS — M06.9 RHEUMATOID ARTHRITIS, INVOLVING UNSPECIFIED SITE, UNSPECIFIED WHETHER RHEUMATOID FACTOR PRESENT  (CMD): ICD-10-CM

## 2024-07-23 DIAGNOSIS — Z95.5 PRESENCE OF BARE METAL STENT IN RIGHT CORONARY ARTERY: Primary | ICD-10-CM

## 2024-07-23 DIAGNOSIS — E78.00 HYPERCHOLESTEREMIA: ICD-10-CM

## 2024-07-23 DIAGNOSIS — I25.10 CORONARY ARTERY DISEASE INVOLVING NATIVE CORONARY ARTERY OF NATIVE HEART WITHOUT ANGINA PECTORIS: ICD-10-CM

## 2024-07-23 PROCEDURE — 99214 OFFICE O/P EST MOD 30 MIN: CPT | Performed by: INTERNAL MEDICINE

## 2024-07-23 RX ORDER — METOPROLOL SUCCINATE 50 MG/1
TABLET, EXTENDED RELEASE ORAL
Qty: 180 TABLET | Refills: 3 | Status: SHIPPED | OUTPATIENT
Start: 2024-07-23

## 2024-07-23 RX ORDER — FLUTICASONE PROPIONATE AND SALMETEROL 250; 50 UG/1; UG/1
POWDER RESPIRATORY (INHALATION)
COMMUNITY

## 2024-07-24 RX ORDER — MONTELUKAST SODIUM 10 MG/1
10 TABLET ORAL NIGHTLY
COMMUNITY

## 2024-07-31 ENCOUNTER — OFFICE VISIT (OUTPATIENT)
Dept: INTERNAL MEDICINE CLINIC | Facility: CLINIC | Age: 84
End: 2024-07-31

## 2024-07-31 VITALS
SYSTOLIC BLOOD PRESSURE: 132 MMHG | BODY MASS INDEX: 28.23 KG/M2 | HEART RATE: 60 BPM | TEMPERATURE: 97 F | OXYGEN SATURATION: 99 % | WEIGHT: 171.5 LBS | DIASTOLIC BLOOD PRESSURE: 70 MMHG | HEIGHT: 65.5 IN

## 2024-07-31 DIAGNOSIS — I65.29 CAROTID ATHEROSCLEROSIS, UNSPECIFIED LATERALITY: ICD-10-CM

## 2024-07-31 DIAGNOSIS — R35.0 BENIGN PROSTATIC HYPERPLASIA WITH URINARY FREQUENCY: ICD-10-CM

## 2024-07-31 DIAGNOSIS — K63.5 POLYP OF COLON, UNSPECIFIED PART OF COLON, UNSPECIFIED TYPE: ICD-10-CM

## 2024-07-31 DIAGNOSIS — E78.00 PURE HYPERCHOLESTEROLEMIA: ICD-10-CM

## 2024-07-31 DIAGNOSIS — I25.10 CORONARY ARTERY DISEASE INVOLVING NATIVE CORONARY ARTERY OF NATIVE HEART WITHOUT ANGINA PECTORIS: ICD-10-CM

## 2024-07-31 DIAGNOSIS — R41.3 MEMORY PROBLEM: ICD-10-CM

## 2024-07-31 DIAGNOSIS — H91.93 BILATERAL HEARING LOSS, UNSPECIFIED HEARING LOSS TYPE: ICD-10-CM

## 2024-07-31 DIAGNOSIS — I77.811 ECTATIC ABDOMINAL AORTA (HCC): ICD-10-CM

## 2024-07-31 DIAGNOSIS — J30.9 ALLERGIC RHINITIS, UNSPECIFIED SEASONALITY, UNSPECIFIED TRIGGER: ICD-10-CM

## 2024-07-31 DIAGNOSIS — Z00.00 MEDICARE ANNUAL WELLNESS VISIT, SUBSEQUENT: Primary | ICD-10-CM

## 2024-07-31 DIAGNOSIS — H61.23 BILATERAL IMPACTED CERUMEN: ICD-10-CM

## 2024-07-31 DIAGNOSIS — N40.1 BENIGN PROSTATIC HYPERPLASIA WITH URINARY FREQUENCY: ICD-10-CM

## 2024-07-31 DIAGNOSIS — Z91.81 AT RISK FOR FALLS: ICD-10-CM

## 2024-07-31 DIAGNOSIS — J45.40 MODERATE PERSISTENT ASTHMA WITHOUT COMPLICATION (HCC): ICD-10-CM

## 2024-07-31 DIAGNOSIS — I10 ESSENTIAL HYPERTENSION: ICD-10-CM

## 2024-07-31 DIAGNOSIS — R20.2 PARESTHESIA OF BOTH HANDS: ICD-10-CM

## 2024-07-31 PROCEDURE — G0439 PPPS, SUBSEQ VISIT: HCPCS | Performed by: INTERNAL MEDICINE

## 2024-07-31 RX ORDER — METOPROLOL SUCCINATE 50 MG/1
50 TABLET, EXTENDED RELEASE ORAL 2 TIMES DAILY
COMMUNITY
Start: 2024-07-24

## 2024-07-31 NOTE — PROGRESS NOTES
Subjective:   Dante Morrison is a 83 year old male who presents for a Subsequent Annual Wellness visit (Pt already had Initial Annual Wellness) and scheduled follow up of multiple significant but stable problems.       History/Other:   Fall Risk Assessment:   He has been screened for Falls and is low risk.      Cognitive Assessment:   He had a completely normal cognitive assessment - see flowsheet entries     Functional Ability/Status:   Dante Morrison has some abnormal functions as listed below:  He has Hearing problems based on screening of functional status.He has Vision problems based on screening of functional status.       Depression Screening (PHQ):  PHQ-2 SCORE: 0  , done 7/31/2024            Advanced Directives:   He does have a Living Will but we do NOT have it on file in Epic.    He does NOT have a Power of  for Health Care. [Do you have a healthcare power of ?: No]  Patient has Advance Care Planning documents present in EMR. Reviewed documents with patient (and family/surrogate if present).      Patient Active Problem List   Diagnosis    Allergic rhinitis    Hyperlipidemia    Benign prostatic hyperplasia    Moderate persistent asthma without complication (HCC)    Essential hypertension    Need for vaccination    Coronary artery disease involving native coronary artery of native heart without angina pectoris    Carotid atherosclerosis    At risk for falls    Bilateral hearing loss    Medicare annual wellness visit, subsequent     Allergies:  He is allergic to augmentin [amoxicillin-pot clavulanate], biaxin [clarithromycin], clarithromycin, and lisinopril.    Current Medications:  Outpatient Medications Marked as Taking for the 7/31/24 encounter (Office Visit) with Berlin Cordova MD   Medication Sig    metoprolol succinate ER 50 MG Oral Tablet 24 Hr Take 1 tablet (50 mg total) by mouth 2 (two) times daily.    Multiple Vitamins-Minerals (OCUVITE OR) Take 1 tablet by mouth daily.     fluticasone-salmeterol 250-50 MCG/ACT Inhalation Aerosol Powder, Breath Activated Inhale 1 puff into the lungs Q12H.    tiotropium (SPIRIVA HANDIHALER) 18 MCG Inhalation Cap Inhale 1 capsule (18 mcg total) into the lungs daily.    omeprazole 20 MG Oral Capsule Delayed Release Take 1 capsule (20 mg total) by mouth before breakfast.    montelukast 10 MG Oral Tab Take 1 tablet (10 mg total) by mouth daily.    LOSARTAN POTASSIUM 50 MG Oral Tab TAKE ONE TABLET BY MOUTH ONCE DAILY    Ascorbic Acid (VITAMIN C OR) Take by mouth.    Ergocalciferol (VITAMIN D OR) Take by mouth.    Cyanocobalamin (VITAMIN B12 OR) Take by mouth.    Fexofenadine HCl (ALLEGRA OR)     AmLODIPine Besylate (NORVASC) 5 MG Oral Tab     aspirin 81 MG Oral Tab Take 1 tablet (81 mg total) by mouth daily. Twice daily    pravastatin (PRAVACHOL) 40 MG Oral Tab Take 1 tablet (40 mg total) by mouth daily.    docusate sodium 100 MG Oral Cap Take by mouth.       Medical History:  He  has a past medical history of Asthma (HCC), BPH (benign prostatic hypertrophy), CORONARY ARTERY DISEASE, Essential hypertension, Glaucoma, History of chest pain, Hypercholesterolemia, Hyperlipidemia, HYPERTENSION, Hypertension, Myocardial infarction (HCC), and OTHER DISEASES.  Surgical History:  He  has a past surgical history that includes upper gi endoscopy,biopsy (4/23/2013); colonoscopy,biopsy (4/23/2013); patient withough preoperative order for iv antibiotic surgical site infection prophylaxis. (4/23/2013); patient documented not to have experienced any of the following events (4/23/2013); angioplasty (coronary) (2009); other surgical history (2009,2010); and colonoscopy.   Family History:  His family history includes Cancer in his brother; Diabetes in his son; Heart Disease in an other family member; Hypertension in his father; Other in his father.  Social History:  He  reports that he quit smoking about 56 years ago. His smoking use included cigarettes. He started smoking  about 59 years ago. He has a 3 pack-year smoking history. He has been exposed to tobacco smoke. He has never used smokeless tobacco. He reports that he does not drink alcohol and does not use drugs.    Tobacco:  He smoked tobacco in the past but quit greater than 12 months ago.  Social History     Tobacco Use   Smoking Status Former    Current packs/day: 0.00    Average packs/day: 1 pack/day for 3.0 years (3.0 ttl pk-yrs)    Types: Cigarettes    Start date: 1965    Quit date: 1968    Years since quittin.6    Passive exposure: Past   Smokeless Tobacco Never        CAGE Alcohol Screen:   CAGE screening score of 0 on 2024, showing low risk of alcohol abuse.      Patient Care Team:  Berlin Cordova MD as PCP - General (Internal Medicine)    Review of Systems  GENERAL: feels well otherwise  SKIN: denies any unusual skin lesions  EYES: denies blurred vision or double vision  HEENT: denies nasal congestion, sinus pain or ST  LUNGS: denies shortness of breath with exertion  CARDIOVASCULAR: denies chest pain on exertion  GI: denies abdominal pain, denies heartburn  : 4 per night nocturia, no complaint of urinary incontinence  MUSCULOSKELETAL: denies back pain  NEURO: denies headaches  PSYCHE: denies depression or anxiety  HEMATOLOGIC: denies hx of anemia  ENDOCRINE: denies thyroid history  ALL/ASTHMA:  hx of allergy or asthma    Objective:   Physical Exam  General Appearance:  Alert, cooperative, no distress, appears stated age   Head:  Normocephalic, without obvious abnormality, atraumatic   Eyes:  PERRL, conjunctiva/corneas clear, EOM's intact, both eyes   Ears:  Normal TM's and external ear canals, both ears   Nose: Nares normal, septum midline, mucosa normal, no drainage or sinus tenderness   Throat: Lips, mucosa, and tongue normal; teeth and gums normal   Neck: Supple, symmetrical, trachea midline, no adenopathy, thyroid: not enlarged, symmetric, no tenderness/mass/nodules, no carotid bruit or  JVD   Back:   Symmetric, no curvature, ROM normal, no CVA tenderness   Lungs:   Clear to auscultation bilaterally, respirations unlabored   Chest Wall:  No tenderness or deformity   Heart:  Regular rate and rhythm, S1, S2 normal, no murmur, rub or gallop   Abdomen:   Soft, non-tender, bowel sounds active all four quadrants,  no masses, no organomegaly   Genitalia: deferred   Rectal: deferred   Extremities: Extremities normal, atraumatic, no cyanosis or edema   Pulses: 2+ and symmetric   Skin: Skin color, texture, turgor normal, no rashes or lesions   Lymph nodes: Cervical, supraclavicular, nodes normal   Neurologic: Normal     /70   Pulse 60   Temp 97 °F (36.1 °C) (Tympanic)   Ht 5' 5.5\" (1.664 m)   Wt 171 lb 8 oz (77.8 kg)   SpO2 99%   BMI 28.11 kg/m²  Estimated body mass index is 28.11 kg/m² as calculated from the following:    Height as of this encounter: 5' 5.5\" (1.664 m).    Weight as of this encounter: 171 lb 8 oz (77.8 kg).    Medicare Hearing Assessment:   Hearing Screening    Time taken: 7/31/2024  1:48 PM  Screening Method: Questionnaire  I have a problem hearing over the telephone: Sometimes I have trouble following the conversations when two or more people are talking at the same time: Sometimes   I have trouble understanding things on the TV: Sometimes I have to strain to understand conversations: Sometimes   I have to worry about missing the telephone ring or doorbell: Sometimes I have trouble hearing conversations in a noisy background such as a crowded room or restaurant: Sometimes   I get confused about where sounds come from: Sometimes I misunderstand some words in a sentence and need to ask people to repeat themselves: Sometimes   I especially have trouble understanding the speech of women and children: Sometimes I have trouble understanding the speaker in a large room such as at a meeting or place of Christian: Sometimes   Many people I talk to seem to mumble (or don't speak clearly):  Sometimes People get annoyed because I misunderstand what they say: No   I misunderstand what others are saying and make inappropriate responses: Sometimes I avoid social activities because I cannot hear well and fear I will reply improperly: Sometimes   Family members and friends have told me they think I may have hearing loss: Sometimes             Visual Acuity:   Right Eye Visual Acuity: Corrected Right Eye Chart Acuity: 20/40   Left Eye Visual Acuity: Corrected Left Eye Chart Acuity: 20/40   Both Eyes Visual Acuity: Corrected Both Eyes Chart Acuity: 20/30   Able To Tolerate Visual Acuity: Yes        Assessment & Plan:   Dante Morrison is a 83 year old male who presents for a Medicare Assessment.     (Z00.00) Medicare annual wellness visit, subsequent  (primary encounter diagnosis)  Plan: CBC With Differential With Platelet, Comp         Metabolic Panel (14), Lipid Panel, TSH W Reflex        To Free T4        Routine labs ordered. Pt declines recommended vaccines.     (I10) Essential hypertension  Plan: bp controlled. Cpm.     (E78.00) Pure hypercholesterolemia  Plan: controlled with chol med and low chol diet.     (I25.10) Coronary artery disease involving native coronary artery of native heart without angina pectoris  Plan: remains asymptomatic, he is on his statin and asa, continues to ffup with his cardio.     (I65.29) Carotid atherosclerosis, unspecified laterality  Plan: less than 50% stenosis on carotid US done by his cardio. Continue medical management with statin and asa    (N40.1,  R35.0) Benign prostatic hyperplasia with urinary frequency  Plan: pt has significant urine  frequency and nocturia, I had been recommending pt to see his urologist even last year but  has not done so.     (J45.40) Moderate persistent asthma without complication (HCC)  Plan: controlled with his current inhalers managed by his pulmologist .    (H91.93) Bilateral hearing loss, unspecified hearing loss type  Plan: pt already uses  hearing aids.     (Z91.81) At risk for falls  Plan: fall precautions     (J30.9) Allergic rhinitis, unspecified seasonality, unspecified trigger  Plan: continue with allergy med.     (K63.5) Polyp of colon, unspecified part of colon, unspecified type  Plan: pt overdue for colonoscopy but continues to decline due to potential risk of colonoscopy .    (R20.2) Paresthesia of both hands  Plan: Vitamin B12, Folic Acid Serum (Folate)        Check b12 and folic acid.     (H61.23) Bilateral impacted cerumen  Plan: ENT Referral - In Network        Refer to ENT.     (R41.3) Memory problem  Plan: Neuro Referral - In Network        Pt again complained about his memory problem, had been taking prevagen which I told him doesn't work, I again advised pt to see neurologist. Will check his b12, folic acid and tsh.     (I77.811) Ectatic abdominal aorta (HCC)  Plan: pt had been ff by cardio; he is already on statin and bp med.      The patient indicates understanding of these issues and agrees to the plan.        No follow-ups on file.     Berlin Cordova MD, 7/31/2024     Supplementary Documentation:   General Health:  In the past six months, have you lost more than 10 pounds without trying?: 1 - Yes  Has your appetite been poor?: No  Type of Diet: Balanced  How does the patient maintain a good energy level?: Appropriate Exercise;Daily Walks  How would you describe your daily physical activity?: Moderate  How would you describe your current health state?: Good  How do you maintain positive mental well-being?: Social Interaction  On a scale of 0 to 10, with 0 being no pain and 10 being severe pain, what is your pain level?: 0 - (None)  In the past six months, have you experienced urine leakage?: 0-No  At any time do you feel concerned for the safety/well-being of yourself and/or your children, in your home or elsewhere?: No  Have you had any immunizations at another office such as Influenza, Hepatitis B, Tetanus, or  Pneumococcal?: No    Health Maintenance   Topic Date Due    Annual Depression Screening  01/01/2024    Fall Risk Screening (Annual)  01/01/2024    Annual Physical  05/09/2024    Colorectal Cancer Screening  05/09/2025 (Originally 8/18/1940)    Zoster Vaccines (1 of 2) 08/07/2027 (Originally [birth year]1991)    COVID-19 Vaccine (3 - 2023-24 season) 08/07/2027 (Originally 9/1/2023)    HTN: BP Follow-Up  08/31/2024    Influenza Vaccine (1) 10/01/2024    Pneumococcal Vaccine: 65+ Years  Completed

## 2024-08-02 PROBLEM — I77.811 ECTATIC ABDOMINAL AORTA (HCC): Status: ACTIVE | Noted: 2024-08-02

## 2024-08-02 PROBLEM — I77.811 ECTATIC ABDOMINAL AORTA: Status: ACTIVE | Noted: 2024-08-02

## 2024-08-05 ENCOUNTER — LAB ENCOUNTER (OUTPATIENT)
Dept: LAB | Age: 84
End: 2024-08-05
Attending: INTERNAL MEDICINE
Payer: MEDICARE

## 2024-08-05 DIAGNOSIS — Z00.00 MEDICARE ANNUAL WELLNESS VISIT, SUBSEQUENT: ICD-10-CM

## 2024-08-05 LAB
ALBUMIN SERPL-MCNC: 4.5 G/DL (ref 3.2–4.8)
ALBUMIN/GLOB SERPL: 1.3 {RATIO} (ref 1–2)
ALP LIVER SERPL-CCNC: 51 U/L
ALT SERPL-CCNC: 19 U/L
ANION GAP SERPL CALC-SCNC: 5 MMOL/L (ref 0–18)
AST SERPL-CCNC: 23 U/L (ref ?–34)
BASOPHILS # BLD AUTO: 0.04 X10(3) UL (ref 0–0.2)
BASOPHILS NFR BLD AUTO: 0.7 %
BILIRUB SERPL-MCNC: 0.6 MG/DL (ref 0.2–1.1)
BUN BLD-MCNC: 17 MG/DL (ref 9–23)
BUN/CREAT SERPL: 16.2 (ref 10–20)
CALCIUM BLD-MCNC: 9.9 MG/DL (ref 8.7–10.4)
CHLORIDE SERPL-SCNC: 107 MMOL/L (ref 98–112)
CHOLEST SERPL-MCNC: 136 MG/DL (ref ?–200)
CO2 SERPL-SCNC: 28 MMOL/L (ref 21–32)
CREAT BLD-MCNC: 1.05 MG/DL
DEPRECATED RDW RBC AUTO: 43.4 FL (ref 35.1–46.3)
EGFRCR SERPLBLD CKD-EPI 2021: 71 ML/MIN/1.73M2 (ref 60–?)
EOSINOPHIL # BLD AUTO: 0.8 X10(3) UL (ref 0–0.7)
EOSINOPHIL NFR BLD AUTO: 13.2 %
ERYTHROCYTE [DISTWIDTH] IN BLOOD BY AUTOMATED COUNT: 13.3 % (ref 11–15)
FASTING PATIENT LIPID ANSWER: YES
FASTING STATUS PATIENT QL REPORTED: YES
FOLATE SERPL-MCNC: 15.4 NG/ML (ref 5.4–?)
GLOBULIN PLAS-MCNC: 3.5 G/DL (ref 2–3.5)
GLUCOSE BLD-MCNC: 92 MG/DL (ref 70–99)
HCT VFR BLD AUTO: 38.5 %
HDLC SERPL-MCNC: 39 MG/DL (ref 40–59)
HGB BLD-MCNC: 12.5 G/DL
IMM GRANULOCYTES # BLD AUTO: 0.01 X10(3) UL (ref 0–1)
IMM GRANULOCYTES NFR BLD: 0.2 %
LDLC SERPL CALC-MCNC: 79 MG/DL (ref ?–100)
LYMPHOCYTES # BLD AUTO: 1.87 X10(3) UL (ref 1–4)
LYMPHOCYTES NFR BLD AUTO: 30.8 %
MCH RBC QN AUTO: 28.7 PG (ref 26–34)
MCHC RBC AUTO-ENTMCNC: 32.5 G/DL (ref 31–37)
MCV RBC AUTO: 88.3 FL
MONOCYTES # BLD AUTO: 0.58 X10(3) UL (ref 0.1–1)
MONOCYTES NFR BLD AUTO: 9.5 %
NEUTROPHILS # BLD AUTO: 2.78 X10 (3) UL (ref 1.5–7.7)
NEUTROPHILS # BLD AUTO: 2.78 X10(3) UL (ref 1.5–7.7)
NEUTROPHILS NFR BLD AUTO: 45.6 %
NONHDLC SERPL-MCNC: 97 MG/DL (ref ?–130)
OSMOLALITY SERPL CALC.SUM OF ELEC: 291 MOSM/KG (ref 275–295)
PLATELET # BLD AUTO: 214 10(3)UL (ref 150–450)
POTASSIUM SERPL-SCNC: 4.4 MMOL/L (ref 3.5–5.1)
PROT SERPL-MCNC: 8 G/DL (ref 5.7–8.2)
RBC # BLD AUTO: 4.36 X10(6)UL
SODIUM SERPL-SCNC: 140 MMOL/L (ref 136–145)
TRIGL SERPL-MCNC: 97 MG/DL (ref 30–149)
TSI SER-ACNC: 3.51 MIU/ML (ref 0.55–4.78)
VIT B12 SERPL-MCNC: 1716 PG/ML (ref 211–911)
VLDLC SERPL CALC-MCNC: 15 MG/DL (ref 0–30)
WBC # BLD AUTO: 6.1 X10(3) UL (ref 4–11)

## 2024-08-05 PROCEDURE — 85025 COMPLETE CBC W/AUTO DIFF WBC: CPT

## 2024-08-05 PROCEDURE — 80061 LIPID PANEL: CPT

## 2024-08-05 PROCEDURE — 82746 ASSAY OF FOLIC ACID SERUM: CPT | Performed by: INTERNAL MEDICINE

## 2024-08-05 PROCEDURE — 36415 COLL VENOUS BLD VENIPUNCTURE: CPT

## 2024-08-05 PROCEDURE — 84443 ASSAY THYROID STIM HORMONE: CPT

## 2024-08-05 PROCEDURE — 80053 COMPREHEN METABOLIC PANEL: CPT

## 2024-08-05 PROCEDURE — 82607 VITAMIN B-12: CPT | Performed by: INTERNAL MEDICINE

## 2024-09-06 RX ORDER — PRAVASTATIN SODIUM 40 MG
40 TABLET ORAL NIGHTLY
Qty: 90 TABLET | Refills: 0 | Status: SHIPPED | OUTPATIENT
Start: 2024-09-06

## 2024-10-26 NOTE — TELEPHONE ENCOUNTER
Refill Per Protocol     Requested Prescriptions   Pending Prescriptions Disp Refills    OMEPRAZOLE 20 MG Oral Capsule Delayed Release [Pharmacy Med Name: OMEPRAZOLE 20MG CAP] 90 capsule 0     Sig: TAKE 1 CAPSULE BY MOUTH BEFORE BREAKFAST       Gastrointestional Medication Protocol Passed - 10/25/2024  7:04 PM        Passed - In person appointment or virtual visit in the past 12 mos or appointment in next 3 mos     Recent Outpatient Visits              2 months ago Medicare annual wellness visit, subsequent    [facility], [address], [place] MD    Office Visit    8 months ago Severe persistent asthma with acute exacerbation (HCC)    [facility], [facility], Mike Patel MD    Office Visit    1 year ago Bilateral impacted cerumen    [facility], [facility], Tor[place] Leary MD    Office Visit    1 year ago Medicare annual wellness visit, subsequent    [facility], [place] Mitchell MD    Office Visit    2 years ago Medicare annual wellness visit, subsequent    [facility], [address], [place] Mitchell MD    Office Visit                               Recent Outpatient Visits              2 months ago Medicare annual wellness visit, subsequent    [facility], [place] Mitchell MD    Office Visit    8 months ago Severe persistent asthma with acute exacerbation (HCC)    [facility], Mike Patel MD    Office Visit    1 year ago Bilateral impacted cerumen    [facility], [facility], Torsten Leary MD    Office Visit    1 year ago Medicare annual wellness visit, subsequent    [facility], [place] Mitchell MD    Office Visit    2 years ago Medicare annual wellness visit, subsequent    [facility], [facility] [place] Cordova MD    Office Visit

## 2024-10-29 ENCOUNTER — TELEPHONE (OUTPATIENT)
Dept: CARDIOLOGY | Age: 84
End: 2024-10-29

## 2024-10-29 RX ORDER — LOSARTAN POTASSIUM 100 MG/1
100 TABLET ORAL DAILY
Qty: 90 TABLET | Refills: 1 | Status: SHIPPED | OUTPATIENT
Start: 2024-10-29

## 2024-12-20 ENCOUNTER — TELEPHONE (OUTPATIENT)
Dept: CARDIOLOGY | Age: 84
End: 2024-12-20

## 2024-12-20 RX ORDER — PRAVASTATIN SODIUM 40 MG
40 TABLET ORAL NIGHTLY
Qty: 30 TABLET | Refills: 0 | Status: SHIPPED | OUTPATIENT
Start: 2024-12-20

## 2025-01-08 RX ORDER — AMLODIPINE BESYLATE 10 MG/1
10 TABLET ORAL DAILY
Qty: 90 TABLET | Refills: 3 | Status: SHIPPED | OUTPATIENT
Start: 2025-01-08

## 2025-01-10 ENCOUNTER — OFFICE VISIT (OUTPATIENT)
Dept: CARDIOLOGY | Age: 85
End: 2025-01-10

## 2025-01-10 ENCOUNTER — ANCILLARY PROCEDURE (OUTPATIENT)
Dept: CARDIOLOGY | Age: 85
End: 2025-01-10
Attending: INTERNAL MEDICINE

## 2025-01-10 ENCOUNTER — APPOINTMENT (OUTPATIENT)
Dept: CARDIOLOGY | Age: 85
End: 2025-01-10
Attending: INTERNAL MEDICINE

## 2025-01-10 VITALS
OXYGEN SATURATION: 98 % | BODY MASS INDEX: 27.92 KG/M2 | SYSTOLIC BLOOD PRESSURE: 165 MMHG | WEIGHT: 173.72 LBS | HEIGHT: 66 IN | RESPIRATION RATE: 16 BRPM | DIASTOLIC BLOOD PRESSURE: 72 MMHG | HEART RATE: 60 BPM

## 2025-01-10 DIAGNOSIS — I77.811 ECTATIC ABDOMINAL AORTA (CMD): ICD-10-CM

## 2025-01-10 DIAGNOSIS — I65.23 BILATERAL CAROTID ARTERY STENOSIS: ICD-10-CM

## 2025-01-10 DIAGNOSIS — I25.10 ASHD (ARTERIOSCLEROTIC HEART DISEASE): ICD-10-CM

## 2025-01-10 DIAGNOSIS — Z95.5 PRESENCE OF BARE METAL STENT IN RIGHT CORONARY ARTERY: ICD-10-CM

## 2025-01-10 DIAGNOSIS — I10 BENIGN ESSENTIAL HYPERTENSION: ICD-10-CM

## 2025-01-10 DIAGNOSIS — M06.9 RHEUMATOID ARTHRITIS, INVOLVING UNSPECIFIED SITE, UNSPECIFIED WHETHER RHEUMATOID FACTOR PRESENT  (CMD): ICD-10-CM

## 2025-01-10 DIAGNOSIS — I25.10 CORONARY ARTERY DISEASE INVOLVING NATIVE CORONARY ARTERY OF NATIVE HEART WITHOUT ANGINA PECTORIS: ICD-10-CM

## 2025-01-10 DIAGNOSIS — E78.00 HYPERCHOLESTEREMIA: ICD-10-CM

## 2025-01-10 DIAGNOSIS — Z95.5 PRESENCE OF BARE METAL STENT IN RIGHT CORONARY ARTERY: Primary | ICD-10-CM

## 2025-01-10 PROCEDURE — 93880 EXTRACRANIAL BILAT STUDY: CPT | Performed by: INTERNAL MEDICINE

## 2025-01-10 PROCEDURE — 76706 US ABDL AORTA SCREEN AAA: CPT | Performed by: INTERNAL MEDICINE

## 2025-01-10 SDOH — HEALTH STABILITY: PHYSICAL HEALTH: ON AVERAGE, HOW MANY MINUTES DO YOU ENGAGE IN EXERCISE AT THIS LEVEL?: 20 MIN

## 2025-01-10 SDOH — HEALTH STABILITY: PHYSICAL HEALTH: ON AVERAGE, HOW MANY DAYS PER WEEK DO YOU ENGAGE IN MODERATE TO STRENUOUS EXERCISE (LIKE A BRISK WALK)?: 7 DAYS

## 2025-01-16 ENCOUNTER — MED REC SCAN ONLY (OUTPATIENT)
Dept: INTERNAL MEDICINE CLINIC | Facility: CLINIC | Age: 85
End: 2025-01-16

## 2025-01-21 ENCOUNTER — TELEPHONE (OUTPATIENT)
Dept: CARDIOLOGY | Age: 85
End: 2025-01-21

## 2025-01-21 RX ORDER — PRAVASTATIN SODIUM 40 MG
40 TABLET ORAL NIGHTLY
Qty: 90 TABLET | Refills: 1 | Status: SHIPPED | OUTPATIENT
Start: 2025-01-21 | End: 2025-01-21 | Stop reason: SDUPTHER

## 2025-01-21 RX ORDER — PRAVASTATIN SODIUM 40 MG
40 TABLET ORAL NIGHTLY
Qty: 90 TABLET | Refills: 2 | Status: SHIPPED | OUTPATIENT
Start: 2025-01-21

## 2025-05-02 RX ORDER — LOSARTAN POTASSIUM 100 MG/1
100 TABLET ORAL DAILY
Qty: 90 TABLET | Refills: 2 | Status: SHIPPED | OUTPATIENT
Start: 2025-05-02

## 2025-05-28 ENCOUNTER — TELEPHONE (OUTPATIENT)
Dept: INTERNAL MEDICINE CLINIC | Facility: CLINIC | Age: 85
End: 2025-05-28

## 2025-07-03 ASSESSMENT — PATIENT HEALTH QUESTIONNAIRE - PHQ9
2. FEELING DOWN, DEPRESSED OR HOPELESS: NOT AT ALL
CLINICAL INTERPRETATION OF PHQ2 SCORE: NO FURTHER SCREENING NEEDED
SUM OF ALL RESPONSES TO PHQ9 QUESTIONS 1 AND 2: 0
SUM OF ALL RESPONSES TO PHQ9 QUESTIONS 1 AND 2: 0
1. LITTLE INTEREST OR PLEASURE IN DOING THINGS: NOT AT ALL

## 2025-07-11 ENCOUNTER — APPOINTMENT (OUTPATIENT)
Dept: CARDIOLOGY | Age: 85
End: 2025-07-11

## 2025-07-15 RX ORDER — METOPROLOL SUCCINATE 50 MG/1
TABLET, EXTENDED RELEASE ORAL
Qty: 180 TABLET | Refills: 3 | Status: SHIPPED | OUTPATIENT
Start: 2025-07-15

## 2025-07-15 RX ORDER — PRAVASTATIN SODIUM 40 MG
40 TABLET ORAL NIGHTLY
Qty: 90 TABLET | Refills: 0 | Status: SHIPPED | OUTPATIENT
Start: 2025-07-15

## 2025-07-17 ENCOUNTER — MED REC SCAN ONLY (OUTPATIENT)
Dept: INTERNAL MEDICINE CLINIC | Facility: CLINIC | Age: 85
End: 2025-07-17

## 2025-08-04 RX ORDER — OMEPRAZOLE 20 MG/1
20 CAPSULE, DELAYED RELEASE ORAL
Qty: 90 CAPSULE | Refills: 0 | OUTPATIENT
Start: 2025-08-04

## (undated) DIAGNOSIS — D64.9 NORMOCYTIC ANEMIA: Primary | ICD-10-CM

## (undated) NOTE — MR AVS SNAPSHOT
[place]  [phone]               Thank you for choosing us for your health care visit with Diane Lott MD.  We are glad to serve you and happy to provide you with this summar 134/84 mmHg 59 97.6 °F (36.4 °C) (Tympanic) 5' 6\" (1.676 m) 183 lb (83.008 kg) 29.55 kg/m2         Current Medications          This list is accurate as of: 6/2/17 11:59 PM.  Always use your most recent med list.                Albuterol Sulfate  If you've recently had a stay at the Hospital you can access your discharge instructions in [employer] by going to Visits < Admission Summaries.  If you've been to the Emergency Department or your doctor's office, you can view your past visit information in My